# Patient Record
Sex: FEMALE | Race: WHITE | URBAN - NONMETROPOLITAN AREA
[De-identification: names, ages, dates, MRNs, and addresses within clinical notes are randomized per-mention and may not be internally consistent; named-entity substitution may affect disease eponyms.]

---

## 2017-10-19 ENCOUNTER — DOCTOR'S OFFICE (OUTPATIENT)
Dept: URBAN - NONMETROPOLITAN AREA CLINIC 1 | Facility: CLINIC | Age: 52
Setting detail: OPHTHALMOLOGY
End: 2017-10-19

## 2017-10-19 DIAGNOSIS — Z96.1: ICD-10-CM

## 2017-10-19 PROCEDURE — C78483: Performed by: OPHTHALMOLOGY

## 2017-10-19 PROCEDURE — TAX CODE TAXABLE SALES: Performed by: OPHTHALMOLOGY

## 2017-11-09 ENCOUNTER — RX ONLY (RX ONLY)
Age: 52
End: 2017-11-09

## 2017-11-09 ENCOUNTER — DOCTOR'S OFFICE (OUTPATIENT)
Dept: URBAN - METROPOLITAN AREA CLINIC 136 | Facility: CLINIC | Age: 52
Setting detail: OPHTHALMOLOGY
End: 2017-11-09

## 2017-11-09 DIAGNOSIS — L98.8: ICD-10-CM

## 2017-11-09 DIAGNOSIS — H52.223: ICD-10-CM

## 2017-11-09 DIAGNOSIS — H52.13: ICD-10-CM

## 2017-11-09 DIAGNOSIS — H52.4: ICD-10-CM

## 2017-11-09 PROBLEM — H44.30 MYOPIC DEGENERATION: Status: ACTIVE | Noted: 2017-11-09

## 2017-11-09 PROBLEM — H25.013 CATARACT CORTICAL SENILE; BOTH EYES: Status: ACTIVE | Noted: 2017-11-09

## 2017-11-09 PROBLEM — H43.811 POST VITREOUS DETACHMENT; RIGHT EYE: Status: ACTIVE | Noted: 2017-11-09

## 2017-11-09 PROBLEM — H40.013 GLAUCOMA SUSPECT OPEN ANGLE; BOTH EYES: Status: ACTIVE | Noted: 2017-11-09

## 2017-11-09 PROCEDURE — NO CHARGE N/C PROFESSIONAL COURTESY: Performed by: OPTOMETRIST

## 2017-11-09 ASSESSMENT — REFRACTION_OUTSIDERX
OS_AXIS: 080
OS_ADD: +2.25
OD_ADD: +2.25
OD_VA1: 20/20
OD_CYLINDER: -0.50
OS_VA2: 20/20
OD_SPHERE: -3.25
OS_VA3: 20/
OD_VA2: 20/20
OD_VA3: 20/
OS_SPHERE: -2.00
OD_AXIS: 045
OS_VA1: 20/20
OU_VA: 20/20
OS_CYLINDER: -1.00

## 2017-11-09 ASSESSMENT — REFRACTION_CURRENTRX
OD_CYLINDER: -0.75
OS_OVR_VA: 20/
OS_VPRISM_DIRECTION: PROGS
OD_OVR_VA: 20/
OD_OVR_VA: 20/
OS_SPHERE: -2.00
OS_CYLINDER: -0.75
OD_AXIS: 31
OS_AXIS: 60
OS_OVR_VA: 20/
OS_OVR_VA: 20/
OD_SPHERE: -3.25
OD_VPRISM_DIRECTION: PROGS
OD_ADD: +2.00
OS_ADD: +2.00
OD_OVR_VA: 20/

## 2017-11-09 ASSESSMENT — CONFRONTATIONAL VISUAL FIELD TEST (CVF)
OD_FINDINGS: FULL
OS_FINDINGS: FULL

## 2017-11-09 ASSESSMENT — REFRACTION_MANIFEST
OS_VA2: 20/
OS_VA2: 20/
OD_VA1: 20/
OS_VA3: 20/
OS_VA3: 20/
OD_VA1: 20/
OU_VA: 20/
OS_VA1: 20/
OS_VA1: 20/
OD_VA2: 20/
OU_VA: 20/
OD_VA2: 20/
OD_VA3: 20/
OD_VA3: 20/

## 2017-11-09 ASSESSMENT — VISUAL ACUITY
OS_BCVA: 20/40-1
OD_BCVA: 20/40

## 2017-11-09 ASSESSMENT — REFRACTION_AUTOREFRACTION
OS_CYLINDER: -1.00
OD_AXIS: 035
OS_AXIS: 080
OS_SPHERE: -2.50
OD_CYLINDER: -0.75
OD_SPHERE: -4.00

## 2017-11-09 ASSESSMENT — KERATOMETRY
OD_AXISANGLE_DEGREES: 107
OD_K1POWER_DIOPTERS: 37.25
OS_K2POWER_DIOPTERS: 36.75
OS_AXISANGLE_DEGREES: 145
OD_K2POWER_DIOPTERS: 37.25
OS_K1POWER_DIOPTERS: 37.00

## 2017-11-09 ASSESSMENT — SPHEQUIV_DERIVED
OD_SPHEQUIV: -4.375
OS_SPHEQUIV: -3

## 2017-11-09 ASSESSMENT — CORNEAL DYSTROPHY - POSTERIOR: OD_POSTERIORDYSTROPHY: GUTTATA

## 2017-11-09 ASSESSMENT — AXIALLENGTH_DERIVED
OD_AL: 28.41
OS_AL: 27.84

## 2017-11-09 ASSESSMENT — DRY EYES - PHYSICIAN NOTES: OD_GENERALCOMMENTS: KSICCA

## 2018-02-06 ENCOUNTER — DOCTOR'S OFFICE (OUTPATIENT)
Dept: URBAN - METROPOLITAN AREA CLINIC 136 | Facility: CLINIC | Age: 53
Setting detail: OPHTHALMOLOGY
End: 2018-02-06

## 2018-02-06 DIAGNOSIS — Z96.1: ICD-10-CM

## 2018-02-06 PROCEDURE — P0000335: Performed by: OPHTHALMOLOGY

## 2018-02-06 PROCEDURE — P0001259 AGE DEFYING ANTIOXIDANT EYE CREAM: Performed by: OPHTHALMOLOGY

## 2018-02-06 PROCEDURE — P0002189 MILD GEL CLEANSER 200 ML: Performed by: OPHTHALMOLOGY

## 2018-04-26 ENCOUNTER — OPTICAL OFFICE (OUTPATIENT)
Dept: URBAN - NONMETROPOLITAN AREA CLINIC 4 | Facility: CLINIC | Age: 53
Setting detail: OPHTHALMOLOGY
End: 2018-04-26

## 2018-04-26 DIAGNOSIS — H52.13: ICD-10-CM

## 2018-04-26 PROCEDURE — S0500 DISPOS CONT LENS: HCPCS | Performed by: OPTOMETRIST

## 2018-04-30 ENCOUNTER — OPTICAL OFFICE (OUTPATIENT)
Dept: URBAN - NONMETROPOLITAN AREA CLINIC 4 | Facility: CLINIC | Age: 53
Setting detail: OPHTHALMOLOGY
End: 2018-04-30

## 2018-04-30 DIAGNOSIS — H52.7: ICD-10-CM

## 2018-04-30 PROCEDURE — V2799T TAXABLE VISION SERVICE MISCELLANEOUS: Performed by: OPTOMETRIST

## 2019-11-22 ENCOUNTER — OFFICE VISIT (OUTPATIENT)
Dept: URGENT CARE | Facility: CLINIC | Age: 54
End: 2019-11-22
Payer: COMMERCIAL

## 2019-11-22 VITALS
HEART RATE: 77 BPM | WEIGHT: 160 LBS | BODY MASS INDEX: 24.25 KG/M2 | RESPIRATION RATE: 18 BRPM | DIASTOLIC BLOOD PRESSURE: 73 MMHG | SYSTOLIC BLOOD PRESSURE: 111 MMHG | HEIGHT: 68 IN | TEMPERATURE: 98.4 F | OXYGEN SATURATION: 97 %

## 2019-11-22 DIAGNOSIS — R10.12 LEFT UPPER QUADRANT PAIN: Primary | ICD-10-CM

## 2019-11-22 PROCEDURE — S9088 SERVICES PROVIDED IN URGENT: HCPCS | Performed by: EMERGENCY MEDICINE

## 2019-11-22 PROCEDURE — 99203 OFFICE O/P NEW LOW 30 MIN: CPT | Performed by: EMERGENCY MEDICINE

## 2019-11-22 NOTE — PROGRESS NOTES
[facility]        NAME: Arturo Paul is a 53 y o  female  : 1965    MRN: [id number]  DATE: 2019  TIME: 9:06 AM    Assessment and Plan   Left upper quadrant pain [R10 12]  1  Left upper quadrant pain  Ambulatory referral to Gastroenterology     I offered to send patient to the ER for further evaluation but she refuses  She will agree to a GI referral     Patient Instructions     Patient Instructions     Abdominal Pain   WHAT YOU NEED TO KNOW:   Abdominal pain can be dull, achy, or sharp  You may have pain in one area of your abdomen, or in your entire abdomen  Your pain may be caused by a condition such as constipation, food sensitivity or poisoning, infection, or a blockage  Abdominal pain can also be from a hernia, appendicitis, or an ulcer  Liver, gallbladder, or kidney conditions can also cause abdominal pain  The cause of your abdominal pain may be unknown  DISCHARGE INSTRUCTIONS:   Return to the emergency department if:   · You have new chest pain or shortness of breath  · You have pulsing pain in your upper abdomen or lower back that suddenly becomes constant  · Your pain is in the right lower abdominal area and worsens with movement  · You have a fever over 100 4°F (38°C) or shaking chills  · You are vomiting and cannot keep food or liquids down  · Your pain does not improve or gets worse over the next 8 to 12 hours  · You see blood in your vomit or bowel movements, or they look black and tarry  · Your skin or the whites of your eyes turn yellow  · You are a woman and have a large amount of vaginal bleeding that is not your monthly period  Contact your healthcare provider if:   · You have pain in your lower back  · You are a man and have pain in your testicles  · You have pain when you urinate  · You have questions or concerns about your condition or care    Follow up with your healthcare provider within 24 hours or as directed: Write down your questions so you remember to ask them during your visits  Medicines:   · Medicines  may be given to calm your stomach and prevent vomiting or to decrease pain  Ask how to take pain medicine safely  · Take your medicine as directed  Contact your healthcare provider if you think your medicine is not helping or if you have side effects  Tell him of her if you are allergic to any medicine  Keep a list of the medicines, vitamins, and herbs you take  Include the amounts, and when and why you take them  Bring the list or the pill bottles to follow-up visits  Carry your medicine list with you in case of an emergency  © 2017 [employer] Information is for End User's use only and may not be sold, redistributed or otherwise used for commercial purposes  All illustrations and images included in CareNotes® are the copyrighted property of [employer]  or Jamar Diaz  The above information is an  only  It is not intended as medical advice for individual conditions or treatments  Talk to your doctor, nurse or pharmacist before following any medical regimen to see if it is safe and effective for you  Muscle Strain   WHAT YOU NEED TO KNOW:   A muscle strain is a twist, pull, or tear of a muscle or tendon  A tendon is a strong elastic tissue that connects a muscle to a bone  Signs of a strained muscle include bruising and swelling over the area, pain with movement, and loss of strength  DISCHARGE INSTRUCTIONS:   Return to the emergency department if:   · You suddenly cannot feel or move your injured muscle  Contact your healthcare provider if:   · Your pain and swelling worsen or do not go away  · You have questions or concerns about your condition or care  Medicines:   · NSAIDs  help decrease swelling and pain or fever  This medicine is available with or without a doctor's order   NSAIDs can cause stomach bleeding or kidney problems in certain people  If you take blood thinner medicine, always ask your healthcare provider if NSAIDs are safe for you  Always read the medicine label and follow directions  · Muscle relaxers  help decrease pain and muscle spasms  · Take your medicine as directed  Contact your healthcare provider if you think your medicine is not helping or if you have side effects  Tell him of her if you are allergic to any medicine  Keep a list of the medicines, vitamins, and herbs you take  Include the amounts, and when and why you take them  Bring the list or the pill bottles to follow-up visits  Carry your medicine list with you in case of an emergency  Follow up with your healthcare provider as directed: Your healthcare provider may suggest that you have a follow-up visit before you go back to your usual activity  Write down your questions so you remember to ask them during your visits  Self-care:   · 3 to 7 days after the injury:  Use Rest, Ice, Compression, and Elevation (RICE) to help stop bruising and decrease pain and swelling  ¨ Rest:  Rest your muscle to allow your injury to heal  When the pain decreases, begin normal, slow movements  For mild and moderate muscle strains, you should rest your muscles for about 2 days  However, if you have a severe muscle strain, you should rest for 10 to 14 days  You may need to use crutches to walk if your muscle strain is in your legs or lower body  ¨ Ice:  Put an ice pack on the injured area  Put a towel between the ice pack and your skin  Do not put the ice pack directly on your skin  You can use a package of frozen peas instead of an ice pack  ¨ Compression:  You may need to wrap an elastic bandage around the area to decrease swelling  It should be tight enough for you to feel support  Do not wrap it too tightly  ¨ Elevation:  Keep the injured muscle raised above your heart if possible   For example if you have a strain of your lower leg muscle, lie down and prop your leg up on pillows  This helps decrease pain and swelling  · 3 to 21 days after the injury:  Start to slowly and regularly exercise your muscle  This will help it heal  If you feel pain, decrease how hard you are exercising  · 1 to 6 weeks after the injury:  Stretch the injured muscle  Hold the stretch for about 30 seconds  Do this 4 times a day  You may stretch the muscle until you feel a slight pull  Stop stretching if you feel pain  · 2 weeks to 6 months after the injury:  The goal of this phase is to return to the activity you were doing before the injury happened, without hurting the muscle again  · 3 weeks to 6 months after the injury:  Keep stretching and strengthening your muscles to avoid injury  Slowly increase the time and distance that you exercise  You may have signs and symptoms of muscle strain 6 months after the injury, even if you do things to help it heal  In this case, you may need surgery on the muscle  © 2017 [employer] Information is for End User's use only and may not be sold, redistributed or otherwise used for commercial purposes  All illustrations and images included in CareNotes® are the copyrighted property of [employer]  or Jamar Diaz  The above information is an  only  It is not intended as medical advice for individual conditions or treatments  Talk to your doctor, nurse or pharmacist before following any medical regimen to see if it is safe and effective for you  Follow up with PCP in 3-5 days  Proceed to  ER if symptoms worsen  Chief Complaint     Chief Complaint   Patient presents with    Abdominal Pain     left upper abd pain started Monday after yoga  pt states that she has been having a full pain for awhile but the sharp pain started Monday  denies n/v  c/o little bit of diarrhea  History of Present Illness       Patient complains of left upper quadrant pain since yoga class 4 days ago    She also admits to some dull epigastric pain for the past 2 weeks  She denies nausea, vomiting or constipation but admits to some diarrhea recently  Review of Systems   Review of Systems   Constitutional: Negative for chills, fatigue and fever  HENT: Negative for congestion, trouble swallowing and voice change  Respiratory: Negative for cough, chest tightness, shortness of breath and wheezing  Cardiovascular: Negative for chest pain  Gastrointestinal: Positive for abdominal pain and diarrhea  Negative for abdominal distention, anal bleeding, blood in stool, nausea, rectal pain and vomiting  Genitourinary: Negative for dysuria  Musculoskeletal: Negative for neck stiffness  Skin: Negative for rash  Hematological: Negative for adenopathy  Current Medications     No current outpatient medications on file  Current Allergies     Allergies as of 11/22/2019    (No Known Allergies)            The following portions of the patient's history were reviewed and updated as appropriate: allergies, current medications, past family history, past medical history, past social history, past surgical history and problem list      History reviewed  No pertinent past medical history  History reviewed  No pertinent surgical history  Family History   Problem Relation Age of Onset    Colon cancer Father     Lung cancer Father          Medications have been verified  Objective   /73   Pulse 77   Temp 98 4 °F (36 9 °C) (Tympanic)   Resp 18   Ht 5' 8" (1 727 m)   Wt 72 6 kg (160 lb)   SpO2 97%   BMI 24 33 kg/m²        Physical Exam     Physical Exam   Constitutional: She is oriented to person, place, and time  She appears well-developed and well-nourished  No distress  HENT:   Head: Normocephalic and atraumatic  Right Ear: Tympanic membrane and external ear normal    Left Ear: Tympanic membrane and external ear normal    Nose: Mucosal edema present     Mouth/Throat: Posterior oropharyngeal erythema present  No oropharyngeal exudate or tonsillar abscesses  Neck: Neck supple  Cardiovascular: Normal rate and regular rhythm  Pulmonary/Chest: Effort normal    Abdominal: Soft  Bowel sounds are normal  She exhibits no distension and no mass  There is tenderness  There is no rebound and no guarding  Tender left upper abdomen muscles at anterior axillary line at margin of rib cage left, no crepitus, no splenomegaly  Neurological: She is alert and oriented to person, place, and time  Skin: Skin is warm and dry  No rash noted  No pallor  Nursing note and vitals reviewed

## 2019-11-22 NOTE — PATIENT INSTRUCTIONS
Abdominal Pain   WHAT YOU NEED TO KNOW:   Abdominal pain can be dull, achy, or sharp  You may have pain in one area of your abdomen, or in your entire abdomen  Your pain may be caused by a condition such as constipation, food sensitivity or poisoning, infection, or a blockage  Abdominal pain can also be from a hernia, appendicitis, or an ulcer  Liver, gallbladder, or kidney conditions can also cause abdominal pain  The cause of your abdominal pain may be unknown  DISCHARGE INSTRUCTIONS:   Return to the emergency department if:   · You have new chest pain or shortness of breath  · You have pulsing pain in your upper abdomen or lower back that suddenly becomes constant  · Your pain is in the right lower abdominal area and worsens with movement  · You have a fever over 100 4°F (38°C) or shaking chills  · You are vomiting and cannot keep food or liquids down  · Your pain does not improve or gets worse over the next 8 to 12 hours  · You see blood in your vomit or bowel movements, or they look black and tarry  · Your skin or the whites of your eyes turn yellow  · You are a woman and have a large amount of vaginal bleeding that is not your monthly period  Contact your healthcare provider if:   · You have pain in your lower back  · You are a man and have pain in your testicles  · You have pain when you urinate  · You have questions or concerns about your condition or care  Follow up with your healthcare provider within 24 hours or as directed:  Write down your questions so you remember to ask them during your visits  Medicines:   · Medicines  may be given to calm your stomach and prevent vomiting or to decrease pain  Ask how to take pain medicine safely  · Take your medicine as directed  Contact your healthcare provider if you think your medicine is not helping or if you have side effects  Tell him of her if you are allergic to any medicine   Keep a list of the medicines, vitamins, and herbs you take  Include the amounts, and when and why you take them  Bring the list or the pill bottles to follow-up visits  Carry your medicine list with you in case of an emergency  © 2017 [employer]  Information is for End User's use only and may not be sold, redistributed or otherwise used for commercial purposes  All illustrations and images included in CareNotes® are the copyrighted property of [employer]  or Jamar Diaz  The above information is an  only  It is not intended as medical advice for individual conditions or treatments  Talk to your doctor, nurse or pharmacist before following any medical regimen to see if it is safe and effective for you  Muscle Strain   WHAT YOU NEED TO KNOW:   A muscle strain is a twist, pull, or tear of a muscle or tendon  A tendon is a strong elastic tissue that connects a muscle to a bone  Signs of a strained muscle include bruising and swelling over the area, pain with movement, and loss of strength  DISCHARGE INSTRUCTIONS:   Return to the emergency department if:   · You suddenly cannot feel or move your injured muscle  Contact your healthcare provider if:   · Your pain and swelling worsen or do not go away  · You have questions or concerns about your condition or care  Medicines:   · NSAIDs  help decrease swelling and pain or fever  This medicine is available with or without a doctor's order  NSAIDs can cause stomach bleeding or kidney problems in certain people  If you take blood thinner medicine, always ask your healthcare provider if NSAIDs are safe for you  Always read the medicine label and follow directions  · Muscle relaxers  help decrease pain and muscle spasms  · Take your medicine as directed  Contact your healthcare provider if you think your medicine is not helping or if you have side effects  Tell him of her if you are allergic to any medicine   Keep a list of the medicines, vitamins, and herbs you take  Include the amounts, and when and why you take them  Bring the list or the pill bottles to follow-up visits  Carry your medicine list with you in case of an emergency  Follow up with your healthcare provider as directed: Your healthcare provider may suggest that you have a follow-up visit before you go back to your usual activity  Write down your questions so you remember to ask them during your visits  Self-care:   · 3 to 7 days after the injury:  Use Rest, Ice, Compression, and Elevation (RICE) to help stop bruising and decrease pain and swelling  ¨ Rest:  Rest your muscle to allow your injury to heal  When the pain decreases, begin normal, slow movements  For mild and moderate muscle strains, you should rest your muscles for about 2 days  However, if you have a severe muscle strain, you should rest for 10 to 14 days  You may need to use crutches to walk if your muscle strain is in your legs or lower body  ¨ Ice:  Put an ice pack on the injured area  Put a towel between the ice pack and your skin  Do not put the ice pack directly on your skin  You can use a package of frozen peas instead of an ice pack  ¨ Compression:  You may need to wrap an elastic bandage around the area to decrease swelling  It should be tight enough for you to feel support  Do not wrap it too tightly  ¨ Elevation:  Keep the injured muscle raised above your heart if possible  For example if you have a strain of your lower leg muscle, lie down and prop your leg up on pillows  This helps decrease pain and swelling  · 3 to 21 days after the injury:  Start to slowly and regularly exercise your muscle  This will help it heal  If you feel pain, decrease how hard you are exercising  · 1 to 6 weeks after the injury:  Stretch the injured muscle  Hold the stretch for about 30 seconds  Do this 4 times a day  You may stretch the muscle until you feel a slight pull  Stop stretching if you feel pain  · 2 weeks to 6 months after the injury:  The goal of this phase is to return to the activity you were doing before the injury happened, without hurting the muscle again  · 3 weeks to 6 months after the injury:  Keep stretching and strengthening your muscles to avoid injury  Slowly increase the time and distance that you exercise  You may have signs and symptoms of muscle strain 6 months after the injury, even if you do things to help it heal  In this case, you may need surgery on the muscle  © 2017 [address] [facility] Information is for End User's use only and may not be sold, redistributed or otherwise used for commercial purposes  All illustrations and images included in CareNotes® are the copyrighted property of [employer]  or Jam[employer] Diaz  The above information is an  only  It is not intended as medical advice for individual conditions or treatments  Talk to your doctor, nurse or pharmacist before following any medical regimen to see if it is safe and effective for you

## 2022-10-20 ENCOUNTER — APPOINTMENT (EMERGENCY)
Dept: RADIOLOGY | Facility: HOSPITAL | Age: 57
End: 2022-10-20
Payer: COMMERCIAL

## 2022-10-20 ENCOUNTER — HOSPITAL ENCOUNTER (EMERGENCY)
Facility: HOSPITAL | Age: 57
Discharge: HOME | End: 2022-10-20
Attending: EMERGENCY MEDICINE
Payer: COMMERCIAL

## 2022-10-20 VITALS
TEMPERATURE: 98.4 F | SYSTOLIC BLOOD PRESSURE: 116 MMHG | HEART RATE: 85 BPM | DIASTOLIC BLOOD PRESSURE: 90 MMHG | WEIGHT: 150 LBS | RESPIRATION RATE: 17 BRPM | OXYGEN SATURATION: 98 % | BODY MASS INDEX: 22.73 KG/M2 | HEIGHT: 68 IN

## 2022-10-20 DIAGNOSIS — S89.91XA KNEE INJURY, RIGHT, INITIAL ENCOUNTER: Primary | ICD-10-CM

## 2022-10-20 PROCEDURE — 99284 EMERGENCY DEPT VISIT MOD MDM: CPT | Performed by: EMERGENCY MEDICINE

## 2022-10-20 PROCEDURE — 73564 X-RAY EXAM KNEE 4 OR MORE: CPT

## 2022-10-20 PROCEDURE — 99283 EMERGENCY DEPT VISIT LOW MDM: CPT

## 2022-10-20 RX ORDER — IBUPROFEN 600 MG/1
600 TABLET ORAL ONCE
Status: COMPLETED | OUTPATIENT
Start: 2022-10-20 | End: 2022-10-20

## 2022-10-20 RX ADMIN — IBUPROFEN 600 MG: 600 TABLET ORAL at 20:52

## 2022-10-21 NOTE — ED PROVIDER NOTES
History  Chief Complaint   Patient presents with   • Knee Pain     Right knee pain since 1800 when a neighbors dog ran into her right knee  Pt unable to bear weight without intense pain  Pt did ice knee and took no meds  Patient's dog ran into her right knee tonight  Complains of pain  Hurts to bear weight  No history of previous fractures  Nothing taken prior to arrival       History provided by:  Patient   used: No    Knee Pain  Location:  Knee  Time since incident:  2 hours  Injury: yes    Mechanism of injury comment:  Dog ran into knee  Knee location:  R knee  Pain details:     Quality:  Aching    Radiates to:  Does not radiate    Severity:  Mild    Onset quality:  Sudden    Duration:  2 hours    Timing:  Constant  Chronicity:  New  Dislocation: no    Prior injury to area:  No  Relieved by:  Nothing  Worsened by:  Bearing weight  Ineffective treatments: Ice  Associated symptoms: no back pain, no fever, no itching, no neck pain and no tingling        None       History reviewed  No pertinent past medical history  History reviewed  No pertinent surgical history  Family History   Problem Relation Age of Onset   • Colon cancer Father    • Lung cancer Father      I have reviewed and agree with the history as documented  E-Cigarette/Vaping     E-Cigarette/Vaping Substances     Social History     Tobacco Use   • Smoking status: Never Smoker   • Smokeless tobacco: Never Used   Substance Use Topics   • Alcohol use: Never   • Drug use: Never       Review of Systems   Constitutional: Negative for chills and fever  HENT: Negative for ear pain, hearing loss, sore throat, trouble swallowing and voice change  Eyes: Negative for pain and discharge  Respiratory: Negative for cough, shortness of breath and wheezing  Cardiovascular: Negative for chest pain and palpitations  Gastrointestinal: Negative for abdominal pain, blood in stool, constipation, diarrhea, nausea and vomiting  Genitourinary: Negative for dysuria, flank pain, frequency and hematuria  Musculoskeletal: Positive for arthralgias  Negative for back pain, joint swelling, neck pain and neck stiffness  Skin: Negative for itching, rash and wound  Neurological: Negative for dizziness, seizures, syncope, facial asymmetry and headaches  Psychiatric/Behavioral: Negative for hallucinations, self-injury and suicidal ideas  All other systems reviewed and are negative  Physical Exam  Physical Exam  Constitutional:       General: She is not in acute distress  Appearance: Normal appearance  She is not ill-appearing  HENT:      Head: Normocephalic and atraumatic  Right Ear: External ear normal       Left Ear: External ear normal       Nose: Nose normal       Mouth/Throat:      Mouth: Mucous membranes are moist    Eyes:      Extraocular Movements: Extraocular movements intact  Pupils: Pupils are equal, round, and reactive to light  Cardiovascular:      Rate and Rhythm: Normal rate and regular rhythm  Pulmonary:      Effort: Pulmonary effort is normal  No respiratory distress  Breath sounds: Normal breath sounds  Abdominal:      General: Abdomen is flat  Bowel sounds are normal  There is no distension  Palpations: Abdomen is soft  Tenderness: There is no abdominal tenderness  Musculoskeletal:         General: Tenderness present  No swelling  Cervical back: Normal range of motion and neck supple  Comments: Right knee with full range of motion  Tender along the medial aspect of the tibial plateau  No joint line tenderness  No laxity  Skin:     General: Skin is warm and dry  Capillary Refill: Capillary refill takes less than 2 seconds  Neurological:      General: No focal deficit present  Mental Status: She is alert and oriented to person, place, and time     Psychiatric:         Mood and Affect: Mood normal          Behavior: Behavior normal          Vital Signs  ED Triage Vitals [10/20/22 1957]   Temperature Pulse Respirations Blood Pressure SpO2   98 4 °F (36 9 °C) 85 17 116/90 98 %      Temp Source Heart Rate Source Patient Position - Orthostatic VS BP Location FiO2 (%)   Temporal Monitor Sitting Right arm --      Pain Score       --           Vitals:    10/20/22 1957   BP: 116/90   Pulse: 85   Patient Position - Orthostatic VS: Sitting         Visual Acuity      ED Medications  Medications - No data to display    Diagnostic Studies  Results Reviewed     None                 XR knee 4+ vw right injury   ED Interpretation by Tisha Huitron MD (10/20 2029)   No fx                 Procedures  Splint application    Date/Time: 10/20/2022 8:32 PM  Performed by: Tisha Huitron MD  Authorized by: Tisha Huitron MD   Universal Protocol:  Consent: Verbal consent obtained  Consent given by: patient  Patient identity confirmed: verbally with patient      Pre-procedure details:     Sensation:  Normal  Procedure details:     Laterality:  Right    Location:  Knee    Knee:  R knee    Strapping: yes      Supplies:  Knee immobilizer  Post-procedure details:     Pain:  Unchanged    Sensation:  Normal    Patient tolerance of procedure: Tolerated well, no immediate complications             ED Course                               SBIRT 20yo+    Flowsheet Row Most Recent Value   SBIRT (23 yo +)    In order to provide better care to our patients, we are screening all of our patients for alcohol and drug use  Would it be okay to ask you these screening questions?  No Filed at: 10/20/2022 1958                    MDM    Disposition  Final diagnoses:   Knee injury, right, initial encounter     Time reflects when diagnosis was documented in both MDM as applicable and the Disposition within this note     Time User Action Codes Description Comment    10/20/2022  8:33 PM Selam Doan Add [F49 [id number]] Knee injury, right, initial encounter       ED Disposition     ED Disposition   Discharge Condition   Stable    Date/Time   Thu Oct 20, 2022  8:33 PM    Comment   Luis Miguel Mccullough discharge to home/self care  Follow-up Information     Follow up With Specialties Details Why Contact Joselin Paniagua DO Family Medicine Call in 1 day  [address] Orthopedic Surgery Call in 1 day  [address] [place] [address] [phone]            Patient's Medications    No medications on file       No discharge procedures on file      PDMP Review     None          ED Provider  Electronically Signed by           Carolyn Hall MD  10/20/22 2034

## 2025-01-15 ENCOUNTER — APPOINTMENT (EMERGENCY)
Dept: CT IMAGING | Facility: HOSPITAL | Age: 60
End: 2025-01-15
Payer: COMMERCIAL

## 2025-01-15 ENCOUNTER — HOSPITAL ENCOUNTER (EMERGENCY)
Facility: HOSPITAL | Age: 60
Discharge: HOME | End: 2025-01-15
Attending: EMERGENCY MEDICINE
Payer: COMMERCIAL

## 2025-01-15 ENCOUNTER — APPOINTMENT (EMERGENCY)
Dept: RADIOLOGY | Facility: HOSPITAL | Age: 60
End: 2025-01-15
Payer: COMMERCIAL

## 2025-01-15 VITALS
OXYGEN SATURATION: 97 % | WEIGHT: 164.9 LBS | TEMPERATURE: 98.2 F | HEART RATE: 79 BPM | HEIGHT: 69 IN | DIASTOLIC BLOOD PRESSURE: 76 MMHG | RESPIRATION RATE: 18 BRPM | BODY MASS INDEX: 24.42 KG/M2 | SYSTOLIC BLOOD PRESSURE: 130 MMHG

## 2025-01-15 DIAGNOSIS — S62.109A WRIST FRACTURE: Primary | ICD-10-CM

## 2025-01-15 PROCEDURE — 99285 EMERGENCY DEPT VISIT HI MDM: CPT | Performed by: EMERGENCY MEDICINE

## 2025-01-15 PROCEDURE — 73110 X-RAY EXAM OF WRIST: CPT

## 2025-01-15 PROCEDURE — 96375 TX/PRO/DX INJ NEW DRUG ADDON: CPT

## 2025-01-15 PROCEDURE — 71260 CT THORAX DX C+: CPT

## 2025-01-15 PROCEDURE — 96372 THER/PROPH/DIAG INJ SC/IM: CPT

## 2025-01-15 PROCEDURE — 74177 CT ABD & PELVIS W/CONTRAST: CPT

## 2025-01-15 PROCEDURE — 73100 X-RAY EXAM OF WRIST: CPT

## 2025-01-15 PROCEDURE — 96374 THER/PROPH/DIAG INJ IV PUSH: CPT

## 2025-01-15 PROCEDURE — 99284 EMERGENCY DEPT VISIT MOD MDM: CPT

## 2025-01-15 PROCEDURE — 29105 APPLICATION LONG ARM SPLINT: CPT | Performed by: EMERGENCY MEDICINE

## 2025-01-15 RX ORDER — OXYCODONE AND ACETAMINOPHEN 5; 325 MG/1; MG/1
1 TABLET ORAL EVERY 8 HOURS PRN
Qty: 15 TABLET | Refills: 0 | Status: SHIPPED | OUTPATIENT
Start: 2025-01-15 | End: 2025-01-15

## 2025-01-15 RX ORDER — ONDANSETRON 2 MG/ML
4 INJECTION INTRAMUSCULAR; INTRAVENOUS ONCE
Status: COMPLETED | OUTPATIENT
Start: 2025-01-15 | End: 2025-01-15

## 2025-01-15 RX ORDER — MORPHINE SULFATE 4 MG/ML
4 INJECTION, SOLUTION INTRAMUSCULAR; INTRAVENOUS ONCE
Status: COMPLETED | OUTPATIENT
Start: 2025-01-15 | End: 2025-01-15

## 2025-01-15 RX ORDER — ETOMIDATE 2 MG/ML
20 INJECTION INTRAVENOUS ONCE
Status: COMPLETED | OUTPATIENT
Start: 2025-01-15 | End: 2025-01-15

## 2025-01-15 RX ORDER — IBUPROFEN 800 MG/1
800 TABLET, FILM COATED ORAL 3 TIMES DAILY
Qty: 30 TABLET | Refills: 0 | Status: SHIPPED | OUTPATIENT
Start: 2025-01-15

## 2025-01-15 RX ORDER — IBUPROFEN 800 MG/1
800 TABLET, FILM COATED ORAL 3 TIMES DAILY
Qty: 30 TABLET | Refills: 0 | Status: SHIPPED | OUTPATIENT
Start: 2025-01-15 | End: 2025-01-15

## 2025-01-15 RX ORDER — OXYCODONE AND ACETAMINOPHEN 5; 325 MG/1; MG/1
1 TABLET ORAL EVERY 8 HOURS PRN
Qty: 15 TABLET | Refills: 0 | Status: SHIPPED | OUTPATIENT
Start: 2025-01-15 | End: 2025-01-21

## 2025-01-15 RX ADMIN — IOHEXOL 100 ML: 350 INJECTION, SOLUTION INTRAVENOUS at 17:16

## 2025-01-15 RX ADMIN — MORPHINE SULFATE 2 MG: 2 INJECTION, SOLUTION INTRAMUSCULAR; INTRAVENOUS at 15:24

## 2025-01-15 RX ADMIN — MORPHINE SULFATE 4 MG: 4 INJECTION INTRAVENOUS at 16:42

## 2025-01-15 RX ADMIN — MORPHINE SULFATE 2 MG: 2 INJECTION, SOLUTION INTRAMUSCULAR; INTRAVENOUS at 19:00

## 2025-01-15 RX ADMIN — ONDANSETRON 4 MG: 2 INJECTION INTRAMUSCULAR; INTRAVENOUS at 16:40

## 2025-01-15 RX ADMIN — ETOMIDATE INJECTION 20 MG: 2 SOLUTION INTRAVENOUS at 17:55
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
## 2025-01-16 ENCOUNTER — HOSPITAL ENCOUNTER (OUTPATIENT)
Dept: RADIOLOGY | Facility: CLINIC | Age: 60
End: 2025-01-16
Payer: COMMERCIAL

## 2025-01-16 ENCOUNTER — OFFICE VISIT (OUTPATIENT)
Dept: OBGYN CLINIC | Facility: CLINIC | Age: 60
End: 2025-01-16
Payer: COMMERCIAL

## 2025-01-16 VITALS — WEIGHT: 164 LBS | HEIGHT: 69 IN | BODY MASS INDEX: 24.29 KG/M2

## 2025-01-16 DIAGNOSIS — S52.601A CLOSED FRACTURE OF DISTAL ENDS OF RIGHT RADIUS AND ULNA, INITIAL ENCOUNTER: Primary | ICD-10-CM

## 2025-01-16 DIAGNOSIS — S52.501A CLOSED FRACTURE OF DISTAL ENDS OF RIGHT RADIUS AND ULNA, INITIAL ENCOUNTER: Primary | ICD-10-CM

## 2025-01-16 DIAGNOSIS — M25.531 PAIN IN RIGHT WRIST: ICD-10-CM

## 2025-01-16 DIAGNOSIS — S52.601A CLOSED FRACTURE OF DISTAL ENDS OF RIGHT RADIUS AND ULNA, INITIAL ENCOUNTER: ICD-10-CM

## 2025-01-16 DIAGNOSIS — S52.501A CLOSED FRACTURE OF DISTAL ENDS OF RIGHT RADIUS AND ULNA, INITIAL ENCOUNTER: ICD-10-CM

## 2025-01-16 PROCEDURE — 29125 APPL SHORT ARM SPLINT STATIC: CPT | Performed by: ORTHOPAEDIC SURGERY

## 2025-01-16 PROCEDURE — 99204 OFFICE O/P NEW MOD 45 MIN: CPT | Performed by: ORTHOPAEDIC SURGERY

## 2025-01-16 PROCEDURE — 73110 X-RAY EXAM OF WRIST: CPT

## 2025-01-16 RX ORDER — ONDANSETRON 4 MG/1
4 TABLET, FILM COATED ORAL EVERY 8 HOURS PRN
Qty: 10 TABLET | Refills: 0 | Status: SHIPPED | OUTPATIENT
Start: 2025-01-16

## 2025-01-16 RX ORDER — OXYCODONE HYDROCHLORIDE 5 MG/1
5-10 TABLET ORAL EVERY 6 HOURS PRN
Qty: 30 TABLET | Refills: 0 | Status: SHIPPED | OUTPATIENT
Start: 2025-01-16

## 2025-01-17 ENCOUNTER — HOSPITAL ENCOUNTER (OUTPATIENT)
Dept: RADIOLOGY | Facility: CLINIC | Age: 60
End: 2025-01-17
Payer: COMMERCIAL

## 2025-01-17 ENCOUNTER — OFFICE VISIT (OUTPATIENT)
Dept: OBGYN CLINIC | Facility: CLINIC | Age: 60
End: 2025-01-17
Payer: COMMERCIAL

## 2025-01-17 VITALS — BODY MASS INDEX: 23.55 KG/M2 | HEIGHT: 69 IN | WEIGHT: 159 LBS

## 2025-01-17 DIAGNOSIS — S52.601A CLOSED FRACTURE OF DISTAL ENDS OF RIGHT RADIUS AND ULNA, INITIAL ENCOUNTER: ICD-10-CM

## 2025-01-17 DIAGNOSIS — S52.501A CLOSED FRACTURE OF DISTAL ENDS OF RIGHT RADIUS AND ULNA, INITIAL ENCOUNTER: Primary | ICD-10-CM

## 2025-01-17 DIAGNOSIS — M25.531 PAIN IN RIGHT WRIST: ICD-10-CM

## 2025-01-17 DIAGNOSIS — S52.601A CLOSED FRACTURE OF DISTAL ENDS OF RIGHT RADIUS AND ULNA, INITIAL ENCOUNTER: Primary | ICD-10-CM

## 2025-01-17 DIAGNOSIS — S52.501A CLOSED FRACTURE OF DISTAL ENDS OF RIGHT RADIUS AND ULNA, INITIAL ENCOUNTER: ICD-10-CM

## 2025-01-17 PROCEDURE — 73110 X-RAY EXAM OF WRIST: CPT

## 2025-01-17 PROCEDURE — 99214 OFFICE O/P EST MOD 30 MIN: CPT | Performed by: STUDENT IN AN ORGANIZED HEALTH CARE EDUCATION/TRAINING PROGRAM
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
## 2025-01-21 ENCOUNTER — OFFICE VISIT (OUTPATIENT)
Dept: OBGYN CLINIC | Facility: CLINIC | Age: 60
End: 2025-01-21
Payer: COMMERCIAL

## 2025-01-21 ENCOUNTER — HOSPITAL ENCOUNTER (OUTPATIENT)
Dept: RADIOLOGY | Facility: CLINIC | Age: 60
Discharge: HOME | End: 2025-01-21
Payer: COMMERCIAL

## 2025-01-21 VITALS — BODY MASS INDEX: 23.54 KG/M2 | HEIGHT: 69 IN | WEIGHT: 158.95 LBS

## 2025-01-21 DIAGNOSIS — S52.601D CLOSED FRACTURE OF DISTAL ENDS OF RIGHT RADIUS AND ULNA WITH ROUTINE HEALING, SUBSEQUENT ENCOUNTER: Primary | ICD-10-CM

## 2025-01-21 DIAGNOSIS — S52.501D CLOSED FRACTURE OF DISTAL ENDS OF RIGHT RADIUS AND ULNA WITH ROUTINE HEALING, SUBSEQUENT ENCOUNTER: ICD-10-CM

## 2025-01-21 DIAGNOSIS — S52.601D CLOSED FRACTURE OF DISTAL ENDS OF RIGHT RADIUS AND ULNA WITH ROUTINE HEALING, SUBSEQUENT ENCOUNTER: ICD-10-CM

## 2025-01-21 DIAGNOSIS — S52.501D CLOSED FRACTURE OF DISTAL ENDS OF RIGHT RADIUS AND ULNA WITH ROUTINE HEALING, SUBSEQUENT ENCOUNTER: Primary | ICD-10-CM

## 2025-01-21 PROCEDURE — 73110 X-RAY EXAM OF WRIST: CPT

## 2025-01-21 PROCEDURE — 99213 OFFICE O/P EST LOW 20 MIN: CPT | Performed by: STUDENT IN AN ORGANIZED HEALTH CARE EDUCATION/TRAINING PROGRAM

## 2025-01-21 NOTE — PROGRESS NOTES
Orthopedic Surgery Management Note  Jonna Mccullough (59 y.o. female)  : 1965 Encounter Date: 2025    Assessment/Plan:  The patient is a 59-year-old female now 1 weeks out from a right distal radius fracture.  At the last visit we had an extensive conversation about the fact that although this appears to be primarily an extra articular fracture, it does have a vertical sheer component similar to a Bartons fracture. Therefore it is more unstable than a classic colles fracture with increased risk for displacement. Often times Bartons fractures are treated surgically, whether or not they are currently displaced. this is due to the risk of displacement. However, today on imaging, there does not appear to be any displacement of the fracture from prior imaging demonstrating at least for the past week it has been stable. Then considering the fracture alignment, I had an extensive conversation with the patient. Between last week imaging and this week imaging there has been a mild loss of radial height of maybe 1 to 2 mm and some mild loss of radio inclination of a few degrees although this is difficult to fully appreciate due to the overly splint material. I would say that her radial inclination is not less than 15°. Additionally, she is still ulnar negative variance. Therefore, by AAOS guidelines, she does not meet any discrete criteria for fixation. We did revisit that her carpus is shifted volar to the axis of the radial shaft however, when comparing this [id number] with the contralateral wrist, you can see that the alignment is almost identical to the uninjured, contralateral wrist, which sits volarly as well.  Therefore I think overall the fracture has had a minimal volar shift and this has not changed from last week's imaging.    After discussion of all these findings the patient and her  have again decided to continue with conservative management.  We will continue the splint for 1 more week and  next week we will convert her to a cast provided everything is stable.  She understands that her fracture is still at risk for displacement and may require surgery in the future if it does become significantly displaced.  Overall her pain is minimal and she has no numbness or tingling.  She is very happy with her care at this time.    Immobilization: maintain splint on right upper extremity  Maintain the injured extremity nonweightbearing.  The splint should be maintained clean and dry.  Elevate the injured extremity to heart level for slightly above.  This will help with the swelling.  Tylenol and oral anti-inflammatories can be taken for pain. The patient was advised that NSAID-type medications have some very important potential side effects including: gastrointestinal irritation including hemorrhage and renal injuries.   Next Visit:  Return in about 1 week (around 1/28/2025)., with new x-rays of the wrist in the splint      Subjective:  59 y.o. female presenting to the office for 1 week follow up, status post right distal radius fracture. They have been immobilized in a splint/cast since the injury.  DOI: 1/15/2025    Patient states that their pain is present - adequately treated. Patient reports pain is on the ulnar aspect of the wrist. Patient has maintained splint as directed.     Review of Systems  Review of systems negative unless otherwise specified in HPI    Past Medical History  No past medical history on file.  Past Surgical History  No past surgical history on file.  Current Medications  Current Outpatient Medications on File Prior to Visit   Medication Sig Dispense Refill    ibuprofen (MOTRIN) 800 mg tablet Take 1 tablet (800 mg total) by mouth 3 (three) times a day 30 tablet 0    ondansetron (ZOFRAN) 4 mg tablet Take 1 tablet (4 mg total) by mouth every 8 (eight) hours as needed for nausea or vomiting 10 tablet 0    oxyCODONE (Roxicodone) 5 immediate release tablet Take 1-2 tablets (5-10 mg total) by  mouth every 6 (six) hours as needed for moderate pain Max Daily Amount: 40 mg 30 tablet 0    [] oxyCODONE-acetaminophen (Percocet) 5-325 mg per tablet Take 1 tablet by mouth every 8 (eight) hours as needed for severe pain for up to 5 days Max Daily Amount: 3 tablets 15 tablet 0     No current facility-administered medications on file prior to visit.       Physical exam  Exam: bilateral, right wrist  Cast/splint is in place, clean and dry. No irritation at the edges  Range of Motion: deferred  Neurovascular status: Neuro intact        Imaging:  Study type: XRAY right wrist(s)  Date: 2025  I have personally reviewed the imaging in PACS and my impression is as follows: relatively maintained alignment of distal radius fracture without significant displacement compared to prior films.     Scribe Attestation      I,:  Olimpia Ann PA-C am acting as a scribe while in the presence of the attending physician.:       I,:  Peyman Coello MD personally performed the services described in this documentation    as scribed in my presence.:

## 2025-01-21 NOTE — PROGRESS NOTES
The patient is a 59-year-old female now 2 weeks out from a right distal radius fracture.  At the last visit we had an extensive conversation about the fact that although this appears to be primarily an extra particular fracture, it does have a vertical sheer component similar to a Bartons fracture. Therefore it is more unstable than a classic fracture with risk for displacement. Often times Bartons fractures are treated surgically, whether or not they are currently displaced. this is due to the risk of displacement. However, today on imaging, they’re just not appear to be any displacement of the fracture from prior imaging demonstrating at least for the past week it has been stable. Then considering the fracture alignment, I had an extensive conversation with the patient. Between last week imaging and this week imaging there has been a mild loss of radial height of maybe 1 to 2 mm and some mild loss of radio inclination of a few degrees although this is difficult to fully appreciate due to the overly splint material. I would say that her radial inclination is not less than 15°. Additionally, she is still ulnar variance. Therefore, by AAOS guidelines, she does not meet any discrete criteria for fixation. We can revisit it that her carpet is shifted Voeller to the axis of the radial shaft however, when comparing this [id number] with the contralateral wrist, you can see that the alignment is almost identical to the uninjured, contralateral wrist, which sits volarly as well.  Therefore I think overall the fracture has had a minimal volar shift and this has not changed from last week's imaging.    After discussion of all these findings the patient and her  have again decided to continue with conservative management.  We will continue the splint for 1 more week and next week we will convert her to a cast provided everything is stable.  She understands that her fracture is still at risk for displacement and may  require surgery in the future if it does become significantly displaced.  Overall her pain is minimal and she has no numbness or tingling.  She is very happy with her care at this time.

## 2025-01-28 ENCOUNTER — OFFICE VISIT (OUTPATIENT)
Dept: OBGYN CLINIC | Facility: CLINIC | Age: 60
End: 2025-01-28
Payer: COMMERCIAL

## 2025-01-28 ENCOUNTER — HOSPITAL ENCOUNTER (OUTPATIENT)
Dept: RADIOLOGY | Facility: CLINIC | Age: 60
Discharge: HOME | End: 2025-01-28
Payer: COMMERCIAL

## 2025-01-28 VITALS — BODY MASS INDEX: 23.85 KG/M2 | WEIGHT: 161 LBS | HEIGHT: 69 IN

## 2025-01-28 DIAGNOSIS — S52.601D CLOSED FRACTURE OF DISTAL ENDS OF RIGHT RADIUS AND ULNA WITH ROUTINE HEALING, SUBSEQUENT ENCOUNTER: Primary | ICD-10-CM

## 2025-01-28 DIAGNOSIS — S52.601D CLOSED FRACTURE OF DISTAL ENDS OF RIGHT RADIUS AND ULNA WITH ROUTINE HEALING, SUBSEQUENT ENCOUNTER: ICD-10-CM

## 2025-01-28 DIAGNOSIS — S52.501D CLOSED FRACTURE OF DISTAL ENDS OF RIGHT RADIUS AND ULNA WITH ROUTINE HEALING, SUBSEQUENT ENCOUNTER: ICD-10-CM

## 2025-01-28 DIAGNOSIS — S52.501D CLOSED FRACTURE OF DISTAL ENDS OF RIGHT RADIUS AND ULNA WITH ROUTINE HEALING, SUBSEQUENT ENCOUNTER: Primary | ICD-10-CM

## 2025-01-28 PROCEDURE — 99213 OFFICE O/P EST LOW 20 MIN: CPT | Performed by: STUDENT IN AN ORGANIZED HEALTH CARE EDUCATION/TRAINING PROGRAM

## 2025-01-28 PROCEDURE — 73110 X-RAY EXAM OF WRIST: CPT

## 2025-01-28 NOTE — PROGRESS NOTES
Orthopedic Surgery Management Note  Jonna Mccullough (59 y.o. female)  : 1965 Encounter Date: 2025    Assessment/Plan:  59 y.o. female 2 weeks follow up status post closed reduction and immobilization for a right distal radius fracture. The fracture has been maintained in acceptable alignment and the patient is tolerating the immobilization well.     Xrays of right wrist were obtained and reviewed in the office today.   Immobilization: maintain splint on right upper extremity  Maintain the injured extremity nonweightbearing.  The splint should be maintained clean and dry.  Elevate the injured extremity to heart level for slightly above.  This will help with the swelling.  Tylenol and oral anti-inflammatories can be taken for pain. The patient was advised that NSAID-type medications have some very important potential side effects including: gastrointestinal irritation including hemorrhage and renal injuries.   Next Visit:  Return in about 1 week (around 2025)., with new x-rays of the wrist in the splint. Will change splint to cast next week.       Subjective:  59 y.o. female presenting to the office for 2 weeks follow up, status post right distal radius fracture. They have been immobilized in a splint/cast since the injury.  DOI: 1/15/2025    Patient states that their pain is present - adequately treated. Patient has maintained splint as directed.     Review of Systems  Review of systems negative unless otherwise specified in HPI    Past Medical History  History reviewed. No pertinent past medical history.  Past Surgical History  History reviewed. No pertinent surgical history.  Current Medications  Current Outpatient Medications on File Prior to Visit   Medication Sig Dispense Refill    ibuprofen (MOTRIN) 800 mg tablet Take 1 tablet (800 mg total) by mouth 3 (three) times a day 30 tablet 0    ondansetron (ZOFRAN) 4 mg tablet Take 1 tablet (4 mg total) by mouth every 8 (eight) hours as needed for  nausea or vomiting 10 tablet 0    oxyCODONE (Roxicodone) 5 immediate release tablet Take 1-2 tablets (5-10 mg total) by mouth every 6 (six) hours as needed for moderate pain Max Daily Amount: 40 mg 30 tablet 0     No current facility-administered medications on file prior to visit.       Physical exam  Exam: right wrist  Cast/splint is in place, clean and dry. No irritation at the edges  Range of Motion: deferred  Neurovascular status: Neuro intact        Imaging:  Study type: XRAY right wrist(s)  Date: 1/28/2025  I have personally reviewed the imaging in PACS and my impression is as follows: maintained alignment of distal radius fracture without further displacement compared to prior films.     Scribe Attestation      I,:  Olimpia Ann PA-C am acting as a scribe while in the presence of the attending physician.:       I,:  Peyman Coello MD personally performed the services described in this documentation    as scribed in my presence.:

## 2025-02-06 ENCOUNTER — OFFICE VISIT (OUTPATIENT)
Dept: OBGYN CLINIC | Facility: CLINIC | Age: 60
End: 2025-02-06
Payer: COMMERCIAL

## 2025-02-06 ENCOUNTER — HOSPITAL ENCOUNTER (OUTPATIENT)
Dept: RADIOLOGY | Facility: CLINIC | Age: 60
End: 2025-02-06
Payer: COMMERCIAL

## 2025-02-06 VITALS — BODY MASS INDEX: 23.84 KG/M2 | HEIGHT: 69 IN | WEIGHT: 160.94 LBS

## 2025-02-06 DIAGNOSIS — S52.601D CLOSED FRACTURE OF DISTAL ENDS OF RIGHT RADIUS AND ULNA WITH ROUTINE HEALING, SUBSEQUENT ENCOUNTER: ICD-10-CM

## 2025-02-06 DIAGNOSIS — S52.601D CLOSED FRACTURE OF DISTAL ENDS OF RIGHT RADIUS AND ULNA WITH ROUTINE HEALING, SUBSEQUENT ENCOUNTER: Primary | ICD-10-CM

## 2025-02-06 DIAGNOSIS — S52.501D CLOSED FRACTURE OF DISTAL ENDS OF RIGHT RADIUS AND ULNA WITH ROUTINE HEALING, SUBSEQUENT ENCOUNTER: ICD-10-CM

## 2025-02-06 DIAGNOSIS — S52.501D CLOSED FRACTURE OF DISTAL ENDS OF RIGHT RADIUS AND ULNA WITH ROUTINE HEALING, SUBSEQUENT ENCOUNTER: Primary | ICD-10-CM

## 2025-02-06 PROCEDURE — 73110 X-RAY EXAM OF WRIST: CPT

## 2025-02-06 PROCEDURE — 29075 APPL CST ELBW FNGR SHORT ARM: CPT | Performed by: STUDENT IN AN ORGANIZED HEALTH CARE EDUCATION/TRAINING PROGRAM

## 2025-02-06 PROCEDURE — 99213 OFFICE O/P EST LOW 20 MIN: CPT | Performed by: STUDENT IN AN ORGANIZED HEALTH CARE EDUCATION/TRAINING PROGRAM

## 2025-02-06 RX ORDER — OXYCODONE HYDROCHLORIDE 5 MG/1
5 TABLET ORAL EVERY 4 HOURS PRN
Qty: 5 TABLET | Refills: 0 | Status: SHIPPED | OUTPATIENT
Start: 2025-02-06

## 2025-02-06 RX ORDER — ACETAMINOPHEN 325 MG/1
325 TABLET ORAL EVERY 6 HOURS PRN
COMMUNITY

## 2025-02-06 NOTE — PROGRESS NOTES
Orthopedic Surgery Management Note  Jonna Mccullough (59 y.o. female)  : 1965 Encounter Date: 2025    Assessment/Plan:  59 y.o. female 3 weeks follow up status post closed reduction and immobilization for a right distal radius fracture. The fracture has been maintained in acceptable alignment and the patient is tolerating the immobilization well.  She was converted to a short arm cast today.  This will remain in place for 3 weeks.    Xrays of right wrist were obtained and reviewed in the office today.   Immobilization: placed in a short arm cast at today's visit  Maintain the injured extremity nonweightbearing.  The cast should be maintained clean and dry.  Elevate the injured extremity to heart level for slightly above.  This will help with the swelling.  Tylenol and oral anti-inflammatories can be taken for pain. The patient was advised that NSAID-type medications have some very important potential side effects including: gastrointestinal irritation including hemorrhage and renal injuries.   Next Visit:  w follow-up in 3 weeks, cast can be removed and then patient should go for x-rays      Subjective:  59 y.o. female presenting to the office for 3 weeks follow up, status post right distal radius fracture. They have been immobilized in a splint/cast since the injury.  DOI: 1/15/2025    Patient states that their pain is present - adequately treated. Patient has maintained splint as directed.     Review of Systems  Review of systems negative unless otherwise specified in HPI    Past Medical History  History reviewed. No pertinent past medical history.  Past Surgical History  History reviewed. No pertinent surgical history.  Current Medications  Current Outpatient Medications on File Prior to Visit   Medication Sig Dispense Refill    acetaminophen (TYLENOL) 325 mg tablet Take 325 mg by mouth every 6 (six) hours as needed for mild pain      ibuprofen (MOTRIN) 800 mg tablet Take 1 tablet (800 mg total) by  mouth 3 (three) times a day 30 tablet 0    oxyCODONE (Roxicodone) 5 immediate release tablet Take 1-2 tablets (5-10 mg total) by mouth every 6 (six) hours as needed for moderate pain Max Daily Amount: 40 mg 30 tablet 0    ondansetron (ZOFRAN) 4 mg tablet Take 1 tablet (4 mg total) by mouth every 8 (eight) hours as needed for nausea or vomiting (Patient not taking: Reported on 2/6/2025) 10 tablet 0     No current facility-administered medications on file prior to visit.       Physical exam  Exam: right wrist  Cast/splint is in place, clean and dry. No irritation at the edges  Cast removed.  Skin intact without irritation  Range of Motion: deferred  Neurovascular status: Neuro intact        Imaging:  Study type: XRAY right wrist(s)  Date: 1/28/2025  I have personally reviewed the imaging in PACS and my impression is as follows: maintained alignment of distal radius fracture without further displacement compared to prior films.   Date: 2/6/2025  I have personally reviewed the imaging in PACS and my impression is as follows: Distal radius fracture with maintained alignment from prior imaging    Procedure:  Cast application    Date/Time: 2/6/2025 1:45 PM    Performed by: Peyman Coello MD  Authorized by: Peyman Coello MD  Universal Protocol:  Consent: Verbal consent obtained.  Risks and benefits: risks, benefits and alternatives were discussed  Consent given by: patient  Timeout called at: 2/6/2025 2:31 PM.  Patient understanding: patient states understanding of the procedure being performed  Patient consent: the patient's understanding of the procedure matches consent given  Patient identity confirmed: verbally with patient    Pre-procedure details:     Sensation:  Normal  Procedure details:     Laterality:  Right    Location:  Wrist    Wrist:  R wristCast type:  Short arm        Supplies:  Cotton padding and Ortho-Glass  Post-procedure details:     Sensation:  Normal    Patient tolerance of procedure:  Tolerated well,  no immediate complications          Scribe Attestation      I,:  Marsha Marquez am acting as a scribe while in the presence of the attending physician.:       I,:  Peyman Coello MD personally performed the services described in this documentation    as scribed in my presence.:

## 2025-02-27 ENCOUNTER — OFFICE VISIT (OUTPATIENT)
Dept: OBGYN CLINIC | Facility: CLINIC | Age: 60
End: 2025-02-27
Payer: COMMERCIAL

## 2025-02-27 ENCOUNTER — HOSPITAL ENCOUNTER (OUTPATIENT)
Dept: RADIOLOGY | Facility: CLINIC | Age: 60
End: 2025-02-27
Payer: COMMERCIAL

## 2025-02-27 VITALS — HEIGHT: 69 IN | BODY MASS INDEX: 23.84 KG/M2 | WEIGHT: 160.94 LBS

## 2025-02-27 DIAGNOSIS — S52.501D CLOSED FRACTURE OF DISTAL ENDS OF RIGHT RADIUS AND ULNA WITH ROUTINE HEALING, SUBSEQUENT ENCOUNTER: Primary | ICD-10-CM

## 2025-02-27 DIAGNOSIS — S52.601D CLOSED FRACTURE OF DISTAL ENDS OF RIGHT RADIUS AND ULNA WITH ROUTINE HEALING, SUBSEQUENT ENCOUNTER: Primary | ICD-10-CM

## 2025-02-27 DIAGNOSIS — S52.601D CLOSED FRACTURE OF DISTAL ENDS OF RIGHT RADIUS AND ULNA WITH ROUTINE HEALING, SUBSEQUENT ENCOUNTER: ICD-10-CM

## 2025-02-27 DIAGNOSIS — S52.501D CLOSED FRACTURE OF DISTAL ENDS OF RIGHT RADIUS AND ULNA WITH ROUTINE HEALING, SUBSEQUENT ENCOUNTER: ICD-10-CM

## 2025-02-27 PROCEDURE — 99213 OFFICE O/P EST LOW 20 MIN: CPT | Performed by: STUDENT IN AN ORGANIZED HEALTH CARE EDUCATION/TRAINING PROGRAM

## 2025-02-27 PROCEDURE — 73110 X-RAY EXAM OF WRIST: CPT

## 2025-02-27 NOTE — PROGRESS NOTES
Orthopedic Surgery Management Note  Jonna Mccullough (59 y.o. female)  : 1965 Encounter Date: 2025    Assessment/Plan:  59 y.o. female 6 weeks follow up status post closed reduction and immobilization for a right distal radius fracture. The fracture has been maintained in acceptable alignment and the patient is tolerating the immobilization well.  She can begin protected range of motion    Xrays of right wrist were obtained and reviewed in the office today.   She was placed in a cock-up wrist brace to be worn for 23 hours a day. She may gradually wean out of the brace.   She was provided home exercises. A referral was placed of OT/PT as well.  Tylenol and oral anti-inflammatories can be taken for pain. The patient was advised that NSAID-type medications have some very important potential side effects including: gastrointestinal irritation including hemorrhage and renal injuries.   Next Visit: Return in about 6 weeks (around 4/10/2025).      Subjective:  59 y.o. female presenting to the office for 6 weeks follow up, status post right distal radius fracture. They have been immobilized in a splint/cast since the injury. She was last seen on 2025 at which time she was transitioned to a short arm cast. She was to continue nonweightbearing. Today, she notes minimal pain. She states it feels stiff now with the cast removed. She denies numbness and tingling.     DOI: 1/15/2025        Review of Systems  Review of systems negative unless otherwise specified in HPI    Past Medical History  History reviewed. No pertinent past medical history.  Past Surgical History  History reviewed. No pertinent surgical history.  Current Medications  Current Outpatient Medications on File Prior to Visit   Medication Sig Dispense Refill    acetaminophen (TYLENOL) 325 mg tablet Take 325 mg by mouth every 6 (six) hours as needed for mild pain      ibuprofen (MOTRIN) 800 mg tablet Take 1 tablet (800 mg total) by mouth 3 (three)  times a day 30 tablet 0    ondansetron (ZOFRAN) 4 mg tablet Take 1 tablet (4 mg total) by mouth every 8 (eight) hours as needed for nausea or vomiting (Patient not taking: Reported on 2/27/2025) 10 tablet 0    oxyCODONE (Roxicodone) 5 immediate release tablet Take 1-2 tablets (5-10 mg total) by mouth every 6 (six) hours as needed for moderate pain Max Daily Amount: 40 mg (Patient not taking: Reported on 2/27/2025) 30 tablet 0    oxyCODONE (Roxicodone) 5 immediate release tablet Take 1 tablet (5 mg total) by mouth every 4 (four) hours as needed for moderate pain Max Daily Amount: 30 mg (Patient not taking: Reported on 2/27/2025) 5 tablet 0     No current facility-administered medications on file prior to visit.       Physical exam  Exam: right wrist  Cast/splint is in place, clean and dry. No irritation at the edges  Cast removed.  Skin intact without irritation  Range of Motion: expected stiffness but minimal pain at the wrist  Neurovascular status: Neuro intact        Imaging:  Study type: XRAY right wrist(s)  Date: 1/28/2025  I have personally reviewed the imaging in PACS and my impression is as follows: maintained alignment of distal radius fracture without further displacement compared to prior films.   Date: 2/6/2025  I have personally reviewed the imaging in PACS and my impression is as follows: Distal radius fracture with maintained alignment from prior imaging    Procedure:  Procedures        Scribe Attestation      I,:  Marsha Marquez am acting as a scribe while in the presence of the attending physician.:       I,:  Peyman Coello MD personally performed the services described in this documentation    as scribed in my presence.:

## 2025-02-27 NOTE — PATIENT INSTRUCTIONS
For the next two weeks at least, use the brace on your injured wrist. Use it longer than two weeks if you continue to have pain. During this time, you should remove the brace 4-5 times a day and practice exercises for your wrist. There are 5 exercises (see below). You can take the brace off to shower and wash hands as well. Do not lift more than 5 lbs in the next four weeks.    Exercises:  - Keep elbow at the side. Rotate palm up and palm down. Assist the rotation with your uninjured hand. Hold for 30 seconds at the point where you feel tightness and minimal pain.  - Keep elbow on the table. Flex wrist down and back. Assist the flexion and back bending with your uninjured hand. Hold for 30 seconds at the point where you feel tightness and minimal pain.  - Clench fist on the injured hand. Assist the clench with your uninjured hand. Hold for 30 seconds at the point where you feel tightness and minimal pain.

## 2025-03-13 ENCOUNTER — TELEPHONE (OUTPATIENT)
Dept: ADMINISTRATIVE | Facility: OTHER | Age: 60
End: 2025-03-13

## 2025-03-13 ENCOUNTER — OFFICE VISIT (OUTPATIENT)
Dept: FAMILY MEDICINE CLINIC | Facility: CLINIC | Age: 60
End: 2025-03-13
Payer: COMMERCIAL

## 2025-03-13 VITALS
DIASTOLIC BLOOD PRESSURE: 72 MMHG | HEIGHT: 69 IN | HEART RATE: 90 BPM | SYSTOLIC BLOOD PRESSURE: 127 MMHG | OXYGEN SATURATION: 98 % | BODY MASS INDEX: 22.93 KG/M2 | WEIGHT: 154.8 LBS

## 2025-03-13 DIAGNOSIS — Z78.0 POST-MENOPAUSAL: ICD-10-CM

## 2025-03-13 DIAGNOSIS — Z00.00 ANNUAL PHYSICAL EXAM: Primary | ICD-10-CM

## 2025-03-13 DIAGNOSIS — Z01.419 GYNECOLOGIC EXAM NORMAL: ICD-10-CM

## 2025-03-13 DIAGNOSIS — Z13.220 LIPID SCREENING: ICD-10-CM

## 2025-03-13 DIAGNOSIS — Z01.419 ENCOUNTER FOR GYNECOLOGICAL EXAMINATION WITHOUT ABNORMAL FINDING: ICD-10-CM

## 2025-03-13 PROCEDURE — 99386 PREV VISIT NEW AGE 40-64: CPT | Performed by: FAMILY MEDICINE

## 2025-03-13 PROCEDURE — 93000 ELECTROCARDIOGRAM COMPLETE: CPT | Performed by: FAMILY MEDICINE

## 2025-03-13 NOTE — TELEPHONE ENCOUNTER
----- Message from Savannah STALEY sent at 3/13/2025  9:24 AM EDT -----  Regarding: Care Gap request  03/13/25 9:24 AM    Hello, our patient attached above has had CRC: Colonoscopy completed/performed. Please assist in updating the patient chart by pulling the Care Everywhere (CE) document. The date of service is 2023.     Thank you,  Savannah Truong  [facility] PRIMARY [facility]
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
## 2025-03-13 NOTE — ASSESSMENT & PLAN NOTE
Reviewed age-appropriate health maintenance and preventive care.      Orders:    POCT ECG    CBC and differential; Future    Comprehensive metabolic panel; Future    Lipid panel; Future    LDL cholesterol, direct; Future

## 2025-03-13 NOTE — LETTER
Procedure Request Form: Colonoscopy      Date Requested: 25  Patient: Jonna Mccullough  Patient : 1965   Referring Provider: Robert Budinetz, MD        Date of Procedure ______________________________       The above patient has informed us that they have completed their   most recent Colonoscopy at your facility. Please complete   this form and attach all corresponding procedure reports/results.    Comments __________________________________________________________  ____________________________________________________________________  ____________________________________________________________________  ____________________________________________________________________    Facility Completing Procedure _________________________________________    Form Completed By (print name) _______________________________________      Signature __________________________________________________________      These reports are needed for  compliance.    Please fax this completed form and a copy of the procedure report to our office located at [address] [place] [address] as soon as possible to Fax [phone] attention Rj: Phone [phone]    We thank you for your assistance in treating our mutual patient.

## 2025-03-13 NOTE — LETTER
Procedure Request Form: Colonoscopy      Date Requested: 25  Patient: Jonna Mccullough  Patient : 1965   Referring Provider: Robert Budinetz, MD        Date of Procedure ______________________________       The above patient has informed us that they have completed their   most recent Colonoscopy at your facility. Please complete   this form and attach all corresponding procedure reports/results.    Comments __________________________________________________________  ____________________________________________________________________  ____________________________________________________________________  ____________________________________________________________________    Facility Completing Procedure _________________________________________    Form Completed By (print name) _______________________________________      Signature __________________________________________________________      These reports are needed for  compliance.    Please fax this completed form and a copy of the procedure report to our office located at [address] [place] [address] as soon as possible to Fax [phone] attention Rj: Phone [phone]    We thank you for your assistance in treating our mutual patient.

## 2025-03-14 NOTE — TELEPHONE ENCOUNTER
Upon review of the In Basket request and the patient's chart, initial outreach has been made via fax to facility. Please see Contacts section for details.     Thank you  FLAVIO GAINES MA

## 2025-03-19 NOTE — TELEPHONE ENCOUNTER
As a follow-up, a second attempt has been made for outreach via fax to facility. Please see Contacts section for details.    Dragan Wiggins   [phone]     Thank you  FLAVIO GAINES MA

## 2025-03-24 NOTE — TELEPHONE ENCOUNTER
As a final attempt, a third outreach has been made via telephone call to facility. Please see Contacts section for details. This encounter will be closed and completed by end of day. Should we receive the requested information because of previous outreach attempts, the requested patient's chart will be updated appropriately.     Thank you  FLAVIO GAINES MA

## 2025-03-31 ENCOUNTER — HOSPITAL ENCOUNTER (OUTPATIENT)
Dept: RADIOLOGY | Facility: CLINIC | Age: 60
Discharge: HOME | End: 2025-03-31
Payer: COMMERCIAL

## 2025-03-31 VITALS — HEIGHT: 67 IN | WEIGHT: 154.8 LBS | BODY MASS INDEX: 24.3 KG/M2

## 2025-03-31 DIAGNOSIS — S52.501A CLOSED FRACTURE OF DISTAL ENDS OF RIGHT RADIUS AND ULNA, INITIAL ENCOUNTER: ICD-10-CM

## 2025-03-31 DIAGNOSIS — S52.601A CLOSED FRACTURE OF DISTAL ENDS OF RIGHT RADIUS AND ULNA, INITIAL ENCOUNTER: ICD-10-CM

## 2025-03-31 PROCEDURE — 77080 DXA BONE DENSITY AXIAL: CPT

## 2025-04-08 ENCOUNTER — APPOINTMENT (OUTPATIENT)
Dept: LAB | Facility: CLINIC | Age: 60
End: 2025-04-08
Payer: COMMERCIAL

## 2025-04-08 DIAGNOSIS — Z13.220 LIPID SCREENING: ICD-10-CM

## 2025-04-08 DIAGNOSIS — Z00.00 ANNUAL PHYSICAL EXAM: ICD-10-CM

## 2025-04-08 PROCEDURE — 83721 ASSAY OF BLOOD LIPOPROTEIN: CPT

## 2025-04-08 PROCEDURE — 80053 COMPREHEN METABOLIC PANEL: CPT

## 2025-04-08 PROCEDURE — 85025 COMPLETE CBC W/AUTO DIFF WBC: CPT

## 2025-04-08 PROCEDURE — 80061 LIPID PANEL: CPT

## 2025-04-10 ENCOUNTER — HOSPITAL ENCOUNTER (OUTPATIENT)
Dept: RADIOLOGY | Facility: CLINIC | Age: 60
End: 2025-04-10
Payer: COMMERCIAL

## 2025-04-10 ENCOUNTER — APPOINTMENT (OUTPATIENT)
Dept: LAB | Facility: HOSPITAL | Age: 60
End: 2025-04-10
Payer: COMMERCIAL

## 2025-04-10 ENCOUNTER — RESULTS FOLLOW-UP (OUTPATIENT)
Dept: FAMILY MEDICINE CLINIC | Facility: CLINIC | Age: 60
End: 2025-04-10

## 2025-04-10 ENCOUNTER — OFFICE VISIT (OUTPATIENT)
Dept: OBGYN CLINIC | Facility: CLINIC | Age: 60
End: 2025-04-10
Payer: COMMERCIAL

## 2025-04-10 VITALS — BODY MASS INDEX: 24.08 KG/M2 | HEIGHT: 67 IN | WEIGHT: 153.4 LBS

## 2025-04-10 DIAGNOSIS — S52.601D CLOSED FRACTURE OF DISTAL ENDS OF RIGHT RADIUS AND ULNA WITH ROUTINE HEALING, SUBSEQUENT ENCOUNTER: ICD-10-CM

## 2025-04-10 DIAGNOSIS — S52.501D CLOSED FRACTURE OF DISTAL ENDS OF RIGHT RADIUS AND ULNA WITH ROUTINE HEALING, SUBSEQUENT ENCOUNTER: ICD-10-CM

## 2025-04-10 DIAGNOSIS — S52.501D CLOSED FRACTURE OF DISTAL ENDS OF RIGHT RADIUS AND ULNA WITH ROUTINE HEALING, SUBSEQUENT ENCOUNTER: Primary | ICD-10-CM

## 2025-04-10 DIAGNOSIS — S52.601D CLOSED FRACTURE OF DISTAL ENDS OF RIGHT RADIUS AND ULNA WITH ROUTINE HEALING, SUBSEQUENT ENCOUNTER: Primary | ICD-10-CM

## 2025-04-10 LAB
ALBUMIN SERPL BCG-MCNC: 4.8 G/DL (ref 3.5–5)
ALP SERPL-CCNC: 60 U/L (ref 34–104)
ALT SERPL W P-5'-P-CCNC: 11 U/L (ref 7–52)
ANION GAP SERPL CALCULATED.3IONS-SCNC: 8 MMOL/L (ref 4–13)
AST SERPL W P-5'-P-CCNC: 15 U/L (ref 13–39)
BASOPHILS # BLD AUTO: 0.03 THOUSANDS/ÂΜL (ref 0–0.1)
BASOPHILS NFR BLD AUTO: 0 % (ref 0–1)
BILIRUB SERPL-MCNC: 0.95 MG/DL (ref 0.2–1)
BUN SERPL-MCNC: 9 MG/DL (ref 5–25)
CALCIUM SERPL-MCNC: 9.6 MG/DL (ref 8.4–10.2)
CHLORIDE SERPL-SCNC: 103 MMOL/L (ref 96–108)
CHOLEST SERPL-MCNC: 209 MG/DL (ref ?–200)
CO2 SERPL-SCNC: 28 MMOL/L (ref 21–32)
CREAT SERPL-MCNC: 0.68 MG/DL (ref 0.6–1.3)
EOSINOPHIL # BLD AUTO: 0.13 THOUSAND/ÂΜL (ref 0–0.61)
EOSINOPHIL NFR BLD AUTO: 2 % (ref 0–6)
ERYTHROCYTE [DISTWIDTH] IN BLOOD BY AUTOMATED COUNT: 12.7 % (ref 11.6–15.1)
GFR SERPL CREATININE-BSD FRML MDRD: 95 ML/MIN/1.73SQ M
GLUCOSE P FAST SERPL-MCNC: 94 MG/DL (ref 65–99)
HCT VFR BLD AUTO: 42.5 % (ref 34.8–46.1)
HDLC SERPL-MCNC: 86 MG/DL
HGB BLD-MCNC: 14 G/DL (ref 11.5–15.4)
IMM GRANULOCYTES # BLD AUTO: 0.02 THOUSAND/UL (ref 0–0.2)
IMM GRANULOCYTES NFR BLD AUTO: 0 % (ref 0–2)
LDLC SERPL CALC-MCNC: 109 MG/DL (ref 0–100)
LDLC SERPL DIRECT ASSAY-MCNC: 112 MG/DL (ref 0–100)
LYMPHOCYTES # BLD AUTO: 2.72 THOUSANDS/ÂΜL (ref 0.6–4.47)
LYMPHOCYTES NFR BLD AUTO: 38 % (ref 14–44)
MCH RBC QN AUTO: 29.5 PG (ref 26.8–34.3)
MCHC RBC AUTO-ENTMCNC: 32.9 G/DL (ref 31.4–37.4)
MCV RBC AUTO: 90 FL (ref 82–98)
MONOCYTES # BLD AUTO: 0.54 THOUSAND/ÂΜL (ref 0.17–1.22)
MONOCYTES NFR BLD AUTO: 8 % (ref 4–12)
NEUTROPHILS # BLD AUTO: 3.66 THOUSANDS/ÂΜL (ref 1.85–7.62)
NEUTS SEG NFR BLD AUTO: 52 % (ref 43–75)
NONHDLC SERPL-MCNC: 123 MG/DL
NRBC BLD AUTO-RTO: 0 /100 WBCS
PLATELET # BLD AUTO: 341 THOUSANDS/UL (ref 149–390)
PMV BLD AUTO: 9.7 FL (ref 8.9–12.7)
POTASSIUM SERPL-SCNC: 4.1 MMOL/L (ref 3.5–5.3)
PROT SERPL-MCNC: 7.4 G/DL (ref 6.4–8.4)
RBC # BLD AUTO: 4.74 MILLION/UL (ref 3.81–5.12)
SODIUM SERPL-SCNC: 139 MMOL/L (ref 135–147)
TRIGL SERPL-MCNC: 71 MG/DL (ref ?–150)
WBC # BLD AUTO: 7.1 THOUSAND/UL (ref 4.31–10.16)

## 2025-04-10 PROCEDURE — 99213 OFFICE O/P EST LOW 20 MIN: CPT | Performed by: STUDENT IN AN ORGANIZED HEALTH CARE EDUCATION/TRAINING PROGRAM

## 2025-04-10 PROCEDURE — 36415 COLL VENOUS BLD VENIPUNCTURE: CPT

## 2025-04-10 PROCEDURE — 73110 X-RAY EXAM OF WRIST: CPT

## 2025-04-16 ENCOUNTER — ANNUAL EXAM (OUTPATIENT)
Dept: OBGYN CLINIC | Facility: CLINIC | Age: 60
End: 2025-04-16

## 2025-04-16 VITALS
WEIGHT: 153.6 LBS | HEIGHT: 67 IN | SYSTOLIC BLOOD PRESSURE: 118 MMHG | BODY MASS INDEX: 24.11 KG/M2 | DIASTOLIC BLOOD PRESSURE: 84 MMHG

## 2025-04-16 DIAGNOSIS — Z12.4 SCREENING FOR CERVICAL CANCER: ICD-10-CM

## 2025-04-16 DIAGNOSIS — Z01.419 ENCOUNTER FOR GYNECOLOGICAL EXAMINATION WITHOUT ABNORMAL FINDING: ICD-10-CM

## 2025-04-16 DIAGNOSIS — N95.2 VAGINAL ATROPHY: Primary | ICD-10-CM

## 2025-04-16 PROCEDURE — G0145 SCR C/V CYTO,THINLAYER,RESCR: HCPCS | Performed by: PATHOLOGY

## 2025-04-16 PROCEDURE — G0476 HPV COMBO ASSAY CA SCREEN: HCPCS | Performed by: OBSTETRICS & GYNECOLOGY

## 2025-04-16 NOTE — ASSESSMENT & PLAN NOTE
- Discussed ACOG guidelines for pap smear screening frequency: performed today  - Discussed healthy lifestyle recommendations for diet, exercise and self breast awareness.  - Discussed ACOG recommendations for screening mammograms: up to date/not indicated today.  - Discussed age based recommendations for adequate calcium and vitamin D intake. Pt aware of recent dx of osteoporosis and will speak with PCP about f/u   - Discussed ACOG recommendations for colon cancer screening: not indicated at this time/up to date   - Safe sex practices were discussed and STI testing was not desired by the patient  - Contraceptive options were reviewed: n/a postmenopausal   - Routine follow up in 1 year was recommended or sooner as needed. All questions and concerns were addressed.

## 2025-04-16 NOTE — PROGRESS NOTES
Subjective      Jonna Mccullough is a 59 y.o. female who presents for annual exam.      Chief Complaint   Patient presents with    New Patient Visit     Yearly      Menopause late 40s/early 50's  Dryness with intercourse, denies vaginal pain otherwise  No PMB   No longer any hot flashes       Last Pap: , results not available   Last mammogram: 2024 [facility]2  Colorectal cancer screenin 1 polyp - 5 years   DEXA:  osteoporosis - has to talk to PCP about it still       History of abnormal Pap smear: no  History of abnormal mammogram: no      Family history of uterine or ovarian cancer: no  Family history of breast cancer: no  Family history of colon cancer: no      Menstrual History:  OB History          2    Para   1    Term   1            AB   1    Living   1         SAB        IAB   1    Ectopic        Multiple        Live Births   1                    No LMP recorded. Patient is postmenopausal.         Past Medical History:   Diagnosis Date    Miscarriage      History reviewed. No pertinent surgical history.  Family History   Problem Relation Age of Onset    Alzheimer's disease Mother     Colon cancer Father     Lung cancer Father     Hypertension Sister     Heart attack Brother        Social History     Tobacco Use    Smoking status: Never    Smokeless tobacco: Never   Vaping Use    Vaping status: Never Used   Substance Use Topics    Alcohol use: Yes     Alcohol/week: 1.0 standard drink of alcohol     Types: 1 Glasses of wine per week    Drug use: Never          Current Outpatient Medications:     acetaminophen (TYLENOL) 325 mg tablet, Take 325 mg by mouth every 6 (six) hours as needed for mild pain, Disp: , Rfl:     ibuprofen (MOTRIN) 800 mg tablet, Take 1 tablet (800 mg total) by mouth 3 (three) times a day, Disp: 30 tablet, Rfl: 0    No Known Allergies        Review of Systems   Constitutional:  Negative for appetite change, chills and fever.   Eyes:  Negative for visual  "disturbance.   Respiratory:  Negative for cough, chest tightness and shortness of breath.    Cardiovascular:  Negative for chest pain.   Gastrointestinal:  Negative for abdominal distention, abdominal pain, constipation, diarrhea, nausea and vomiting.   Endocrine: Negative for cold intolerance and heat intolerance.   Genitourinary:  Negative for difficulty urinating, dyspareunia, dysuria, frequency, genital sores, pelvic pain, urgency, vaginal bleeding, vaginal discharge and vaginal pain.   Musculoskeletal:  Negative for arthralgias.   Neurological:  Negative for light-headedness and headaches.   Hematological:  Does not bruise/bleed easily.   Psychiatric/Behavioral:  Negative for behavioral problems.    All other systems reviewed and are negative.      /84 (Patient Position: Sitting, Cuff Size: Standard)   Ht 5' 7.25\" (1.708 m)   Wt 69.7 kg (153 lb 9.6 oz)   BMI 23.88 kg/m²         Physical Exam  Constitutional:       General: She is not in acute distress.     Appearance: Normal appearance.   Genitourinary:      Vulva, bladder and urethral meatus normal.      No lesions in the vagina.      Right Labia: No rash, tenderness, lesions or skin changes.     Left Labia: No tenderness, lesions, skin changes or rash.     No labial fusion noted.      No inguinal adenopathy present in the right or left side.     No vaginal discharge, erythema, tenderness, bleeding or ulceration.      No vaginal prolapse present.     Mild vaginal atrophy present.       Right Adnexa: not tender, not full and no mass present.     Left Adnexa: not tender, not full and no mass present.     No cervical motion tenderness, discharge, friability, lesion or polyp.      Uterus is not enlarged, fixed, tender or irregular.      No uterine mass detected.     Uterus is anteverted.      Pelvic exam was performed with patient in the lithotomy position.   Breasts:     Right: No swelling, bleeding, inverted nipple, mass, nipple discharge, skin change " or tenderness.      Left: No swelling, bleeding, inverted nipple, mass, nipple discharge, skin change or tenderness.   HENT:      Head: Normocephalic and atraumatic.   Neck:      Thyroid: No thyromegaly.   Cardiovascular:      Rate and Rhythm: Normal rate and regular rhythm.   Pulmonary:      Effort: Pulmonary effort is normal. No accessory muscle usage or respiratory distress.   Abdominal:      General: There is no distension.      Palpations: Abdomen is soft.      Tenderness: There is no abdominal tenderness. There is no guarding or rebound.   Musculoskeletal:         General: Normal range of motion.      Cervical back: Normal range of motion and neck supple.   Lymphadenopathy:      Upper Body:      Right upper body: No supraclavicular or axillary adenopathy.      Left upper body: No supraclavicular or axillary adenopathy.      Lower Body: No right inguinal and no right inguinal adenopathy. No left inguinal and no left inguinal adenopathy.   Neurological:      General: No focal deficit present.      Mental Status: She is alert.   Skin:     General: Skin is warm and dry.      Findings: No erythema.   Psychiatric:         Mood and Affect: Mood normal.         Behavior: Behavior normal.   Vitals and nursing note reviewed. Exam conducted with a chaperone present.               Encounter for gynecological examination without abnormal finding  - Discussed ACOG guidelines for pap smear screening frequency: performed today  - Discussed healthy lifestyle recommendations for diet, exercise and self breast awareness.  - Discussed ACOG recommendations for screening mammograms: up to date/not indicated today.  - Discussed age based recommendations for adequate calcium and vitamin D intake. Pt aware of recent dx of osteoporosis and will speak with PCP about f/u   - Discussed ACOG recommendations for colon cancer screening: not indicated at this time/up to date   - Safe sex practices were discussed and STI testing was not  desired by the patient  - Contraceptive options were reviewed: n/a postmenopausal   - Routine follow up in 1 year was recommended or sooner as needed. All questions and concerns were addressed.       Vaginal atrophy  Reviewed OTC lubricants, moisturizers. Discussed vaginal estrogen if no relief.

## 2025-04-16 NOTE — PATIENT INSTRUCTIONS
Vaginal moisturizers can be used every day for 2 weeks, then twice weekly after that.  I recommend water or silicone based lubricants such as Replens, HyaloGYN, Uberlube, Yes!, Revaree.

## 2025-04-17 ENCOUNTER — TELEPHONE (OUTPATIENT)
Dept: FAMILY MEDICINE CLINIC | Facility: CLINIC | Age: 60
End: 2025-04-17

## 2025-04-17 ENCOUNTER — PATIENT MESSAGE (OUTPATIENT)
Dept: FAMILY MEDICINE CLINIC | Facility: CLINIC | Age: 60
End: 2025-04-17

## 2025-04-17 DIAGNOSIS — M81.0 OSTEOPOROSIS, UNSPECIFIED OSTEOPOROSIS TYPE, UNSPECIFIED PATHOLOGICAL FRACTURE PRESENCE: Primary | ICD-10-CM

## 2025-04-17 LAB
HPV HR 12 DNA CVX QL NAA+PROBE: POSITIVE
HPV16 DNA CVX QL NAA+PROBE: NEGATIVE
HPV18 DNA CVX QL NAA+PROBE: NEGATIVE

## 2025-04-17 NOTE — PATIENT COMMUNICATION
Pt notified and per pt request I sent my chart message with the information for Dr. Epifanio Navarro in [facility].

## 2025-04-18 NOTE — TELEPHONE ENCOUNTER
Spoke with pt to go over results and next steps, phone call was cut off. Pt called back and spoke with one of my coworkers.

## 2025-04-23 ENCOUNTER — RESULTS FOLLOW-UP (OUTPATIENT)
Dept: OBGYN CLINIC | Facility: CLINIC | Age: 60
End: 2025-04-23

## 2025-04-23 LAB
LAB AP GYN PRIMARY INTERPRETATION: NORMAL
Lab: NORMAL
PATH INTERP SPEC-IMP: NORMAL

## 2025-04-23 PROCEDURE — G0124 SCREEN C/V THIN LAYER BY MD: HCPCS | Performed by: PATHOLOGY

## 2025-04-23 NOTE — TELEPHONE ENCOUNTER
Patient calling in with additional questions regarding results. Reviewed general education about HPV and abnormal pap smear results. Patient verbalized understanding.

## 2025-05-16 ENCOUNTER — PROCEDURE VISIT (OUTPATIENT)
Dept: OBGYN CLINIC | Facility: CLINIC | Age: 60
End: 2025-05-16

## 2025-05-16 VITALS
SYSTOLIC BLOOD PRESSURE: 128 MMHG | BODY MASS INDEX: 23.98 KG/M2 | HEIGHT: 67 IN | DIASTOLIC BLOOD PRESSURE: 86 MMHG | WEIGHT: 152.8 LBS

## 2025-05-16 DIAGNOSIS — R87.619 ATYPICAL GLANDULAR CELLS ON CERVICAL PAP SMEAR: Primary | ICD-10-CM

## 2025-05-16 PROBLEM — Z01.419 ENCOUNTER FOR GYNECOLOGICAL EXAMINATION WITHOUT ABNORMAL FINDING: Status: RESOLVED | Noted: 2025-04-16 | Resolved: 2025-05-16

## 2025-05-16 NOTE — ASSESSMENT & PLAN NOTE
Due to AGC/HPV+ reviewed recommendation for colposcopy with ECC and EMB  She reiterates no prior abnml pap and no PMB   Small amount of ACW change. Biopsies taken  EMB with small amount of tissue despite being able to sound easily to 7 cm without dilation and pt's good tolerance of procedure  Will f/u in 2 weeks to discuss pathology  Precautions reviewed

## 2025-05-16 NOTE — PROGRESS NOTES
Colposcopy    Date/Time: 5/16/2025 1:00 PM    Performed by: Madisyn Scales MD  Authorized by: Madisyn Scales MD    Verbal consent obtained?: Yes    Written consent obtained?: Yes    Risks and benefits: Risks, benefits and alternatives were discussed    Consent given by:  Patient  Patient states understanding of procedure being performed: Yes    Patient's understanding of procedure matches consent: Yes    Procedure consent matches procedure scheduled: Yes    Relevant documents present and verified: Yes    Test results available and properly labeled: Yes    Required items: Required blood products, implants, devices and special equipment available    Patient identity confirmed:  Verbally with patient  Indication:     Indications: AGC.  Procedure:     Procedure: Colposcopy w/ cervical biopsy and ECC      Under satisfactory analgesia the patient was prepped and draped in the dorsal lithotomy position: yes      Klondike speculum was placed in the vagina: yes      Cervix was cleansed with betadine: yes      Under colposcopic examination the transition zone was seen in entirety: yes      Endocervix was curetted using a Kevorkian curette: yes      Cervical biopsy performed with a cervical biopsy punch: yes (12 and 9 o clock)      Allis/Tenaculum removed and cervix homostatic: yes      Specimen(s) to pathology: yes    Post-procedure:     Findings: White epithelium      Impression: Low grade cervical dysplasia      Patient tolerance of procedure:  Tolerated well, no immediate complications  Endometrial biopsy    Date/Time: 5/16/2025 1:00 PM    Performed by: Madisyn Scales MD  Authorized by: Madisyn Scales MD    [place] Protocol:  Consent: Verbal consent obtained. Written consent obtained  Risks and benefits: risks, benefits and alternatives were discussed  Consent given by: patient  Patient understanding: patient states understanding of the procedure being performed  Patient consent: the patient's  understanding of the procedure matches consent given  Procedure consent: procedure consent matches procedure scheduled  Relevant documents: relevant documents present and verified  Test results: test results available and properly labeled  Required items: required blood products, implants, devices, and special equipment available  Patient identity confirmed: verbally with patient    Procedure:     Procedure: endometrial biopsy with Pipelle      A bivalve speculum was placed in the vagina: yes      Cervix cleaned and prepped: yes      The cervix was dilated: no      Uterus sounded: yes      Uterus sound depth (cm):  7    Specimen collected: specimen collected and sent to pathology      Patient tolerated procedure well with no complications: yes    Comments:     Procedure comments:  Somewhat small amount of tissue - reviewed possibility of insufficient endometrial sampling         Atypical glandular cells on cervical Pap smear  Due to AGC/HPV+ reviewed recommendation for colposcopy with ECC and EMB  She reiterates no prior abnml pap and no PMB   Small amount of ACW change. Biopsies taken  EMB with small amount of tissue despite being able to sound easily to 7 cm without dilation and pt's good tolerance of procedure  Will f/u in 2 weeks to discuss pathology  Precautions reviewed

## 2025-05-30 PROCEDURE — 88344 IMHCHEM/IMCYTCHM EA MLT ANTB: CPT | Performed by: PATHOLOGY

## 2025-05-30 PROCEDURE — 88342 IMHCHEM/IMCYTCHM 1ST ANTB: CPT | Performed by: PATHOLOGY

## 2025-05-30 PROCEDURE — 88360 TUMOR IMMUNOHISTOCHEM/MANUAL: CPT | Performed by: PATHOLOGY

## 2025-05-30 PROCEDURE — 88305 TISSUE EXAM BY PATHOLOGIST: CPT | Performed by: PATHOLOGY

## 2025-06-01 ENCOUNTER — RESULTS FOLLOW-UP (OUTPATIENT)
Dept: OBGYN CLINIC | Facility: CLINIC | Age: 60
End: 2025-06-01

## 2025-06-01 DIAGNOSIS — R87.69: Primary | ICD-10-CM

## 2025-06-04 ENCOUNTER — TELEPHONE (OUTPATIENT)
Dept: OBGYN CLINIC | Facility: CLINIC | Age: 60
End: 2025-06-04

## 2025-06-04 ENCOUNTER — OFFICE VISIT (OUTPATIENT)
Dept: OBGYN CLINIC | Facility: CLINIC | Age: 60
End: 2025-06-04

## 2025-06-04 VITALS
DIASTOLIC BLOOD PRESSURE: 80 MMHG | OXYGEN SATURATION: 95 % | WEIGHT: 154 LBS | HEIGHT: 67 IN | SYSTOLIC BLOOD PRESSURE: 118 MMHG | BODY MASS INDEX: 24.17 KG/M2 | HEART RATE: 76 BPM

## 2025-06-04 DIAGNOSIS — R87.619 ATYPICAL GLANDULAR CELLS ON CERVICAL PAP SMEAR: Primary | ICD-10-CM

## 2025-06-04 RX ORDER — MISOPROSTOL 200 UG/1
TABLET ORAL
Qty: 2 TABLET | Refills: 0 | Status: SHIPPED | OUTPATIENT
Start: 2025-06-04 | End: 2025-06-13

## 2025-06-04 NOTE — TELEPHONE ENCOUNTER
Talked to patient she is scheduled for her surgical procedure on 6/13/2025 with Dr. Scales at the [facility]. Patient is aware of lab work needed prior to her surgical procedure. 2 week post op appointment scheduled for 7/3/2025

## 2025-06-04 NOTE — PROGRESS NOTES
"    Subjective   Patient ID: Jonna Mccullough is a 59 y.o. female.    Patient is here for a problem visit.    Chief Complaint   Patient presents with    Follow-up     Here to discuss D&C procedure     Menstrual History:  OB History          2    Para   1    Term   1            AB   1    Living   1         SAB        IAB   1    Ectopic        Multiple        Live Births   1                  No LMP recorded. Patient is postmenopausal.         Past Medical History[1]    Past Surgical History[2]    Social History[3]     No Known Allergies    Current Medications[4]      Review of Systems   Constitutional:  Negative for appetite change, chills and fever.   Eyes:  Negative for visual disturbance.   Respiratory:  Negative for cough, chest tightness and shortness of breath.    Cardiovascular:  Negative for chest pain.   Gastrointestinal:  Negative for abdominal distention, abdominal pain, constipation, diarrhea, nausea and vomiting.   Endocrine: Negative for cold intolerance and heat intolerance.   Genitourinary:  Negative for difficulty urinating, dyspareunia, dysuria, frequency, genital sores, pelvic pain, urgency, vaginal bleeding, vaginal discharge and vaginal pain.   Musculoskeletal:  Negative for arthralgias.   Neurological:  Negative for light-headedness and headaches.   Hematological:  Does not bruise/bleed easily.   Psychiatric/Behavioral:  Negative for behavioral problems.    All other systems reviewed and are negative.        /80 (BP Location: Right arm, Patient Position: Sitting, Cuff Size: Adult)   Pulse 76   Ht 5' 7\" (1.702 m)   Wt 69.9 kg (154 lb)   SpO2 95%   BMI 24.12 kg/m²       Physical Exam  Constitutional:       General: She is not in acute distress.     Appearance: Normal appearance.   HENT:      Head: Normocephalic and atraumatic.     Cardiovascular:      Rate and Rhythm: Normal rate.   Pulmonary:      Effort: Pulmonary effort is normal. No respiratory distress. "     Neurological:      General: No focal deficit present.      Mental Status: She is alert.     Psychiatric:         Mood and Affect: Mood normal.         Behavior: Behavior normal.   Vitals and nursing note reviewed.           Results for orders placed or performed in visit on 05/16/25   Tissue Exam   Result Value Ref Range    Case Report       Surgical Pathology Report                         Case: [id number]                                  Authorizing Provider:  Madisyn Scales MD      Collected:           05/16/2025 [facility]              Ordering Location:     [facility]  Received:            05/16/2025 [facility]                                     [facility]                                                                 Pathologist:           Henry Goldstein MD                                                            Specimens:   A) - Endometrium, EMB                                                                               B) - Vaginal/Cervical, CX BX 12 o'clock                                                             C) - Vaginal/Cervical, CX BX 9 o'clock                                                              D) - Endocervical, ECC                                                                     Final Diagnosis       A. Endometrium, EMB:  Rare minute fragments of inactive endometrial glands and endocervical glands intermixed with abundant mucus and some blood  No atypia or malignancy identified in this limited cellularity specimen     B. Vaginal/Cervical, CX BX 12 o'clock:  Polypoid fragment of partially denuded benign squamocolumnar junction mucosa with submucosal Nabothian cyst    C. Vaginal/Cervical, CX BX 9 o'clock:  Transitional zone mucosa with focal cytologic atypia     See note    D. Endocervical, ECC:  Minute fragments of atypical squamous epithelium concerning for dysplasia  Background fragments of endocervical mucosa with squamous metaplasia intermixed with abundant  "mucus    See note          Note       C. This is a largely denuded mucosa. There is focal cytologic atypia with focal nuclear enlargement and hyperchromasia of uncertain significance (immature squamous metaplasia versus dysplasia).  These atypical foci are not present on p16/Ki67 double immunostained and p16 and Ki67 immunostained slides precluding further characterization. Multiple H&E levels examined.  If clinically indicated additional sampling may be helpful.     D. The atypical focus is not seen on deeper levels on H&E stained and p16/ki67 immunostained  slides.    Intradepartmental GYN pathologist review in agreement    nterpretation performed at [facility], [address] [place] [id number]        Additional Information       All reported additional testing was performed with appropriately reactive controls.  These tests were developed and their performance characteristics determined by [facility] or appropriate performing facility, though some tests may be performed on tissues which have not been validated for performance characteristics (such as staining performed on alcohol exposed cell blocks and decalcified tissues).  Results should be interpreted with caution and in the context of the patients’ clinical condition. These tests may not be cleared or approved by the U.S. Food and Drug Administration, though the FDA has determined that such clearance or approval is not necessary. These tests are used for clinical purposes and they should not be regarded as investigational or for research. This laboratory has been approved by CLIA 88, designated as a high-complexity laboratory and is qualified to perform these tests.  .      Gross Description       A. The specimen is received in formalin, labeled with the patient's name and hospital number, and is designated \" endometrium EMB\".  It consists of a 2.5 x 1.5 x 0.1 cm aggregate of tan-brown tissue fragments and mucoid material.  " "Entirely submitted in an embedding bag in cassette A1.  B. The specimen is received in formalin, labeled with the patient's name and hospital number, and is designated \" cervix biopsy 12:00\".  It consists of a 0.3 cm tan-brown tissue fragment.  Entirely submitted in a screen cassette.  C. The specimen is received in formalin, labeled with the patient's name and hospital number, and is designated \" cervix biopsy 9:00\".  It consists of two 0.1 cm tan-pink tissue fragments.  Entirely submitted in a screen cassette.  D. The specimen is received in formalin, labeled with the patient's name and hospital number, and is designated \" endocervical ECC\".  It consists of a 2.5 x 1.5 x 0.1 cm aggregate of tan-brown tissue fragments and mucoid material.  Entirely submitted in an embedding bag in cassette D1.    Note: The estimated total formalin fixation time based upon information provided by the submitting clinician and the standard processing schedule is under 72 hours.  [place]rentino      Clinical Information       DO   Impression: Low grade cervical dysplasia               Appropriate laboratory testing, imaging studies, and prior external records were reviewed:     Assessment/Plan:       Problem List Items Addressed This Visit       Atypical glandular cells on cervical Pap smear - Primary    Discussed given somewhat insufficient endometrial sampling and mild atypia within ECC will recommend D&C, hysteroscopy, with repeat endocervical sampling    Discussed exam under anesthesia, dilation and curettage, hysteroscopy, endocervical curettage and associated risks/benefits/alternatives. Reviewed risks of pain, bleeding, infection, damage to surrounding structures, uterine perforation, fluid overload, need for additional procedures, and risks associated with anesthesia.   Reviewed that a pelvic exam under anesthesia is within the scope of care for the above surgical procedure.   Patient voiced understanding of above and desires to " proceed.  - PAT and instructions reviewed, wash given. Surgical e-consent signed  - misoprostol sent to be used day of surgery  - aware to schedule pelvic US to be done before surgery  - surgical scheduler to contact patient            Relevant Medications    miSOPROStol (Cytotec) 200 mcg tablet                    [1]   Past Medical History:  Diagnosis Date    Miscarriage 1999   [2]   Past Surgical History:  Procedure Laterality Date    DILATION AND CURETTAGE OF UTERUS WITH HYSTEROSOCPY     [3]   Social History  Tobacco Use    Smoking status: Never    Smokeless tobacco: Never   Vaping Use    Vaping status: Never Used   Substance Use Topics    Alcohol use: Yes     Alcohol/week: 1.0 standard drink of alcohol     Types: 1 Glasses of wine per week    Drug use: Never   [4]   Current Outpatient Medications:     miSOPROStol (Cytotec) 200 mcg tablet, Place 2 tablets in vagina 2-3 hours before surgery, Disp: 2 tablet, Rfl: 0    acetaminophen (TYLENOL) 325 mg tablet, Take 325 mg by mouth every 6 (six) hours as needed for mild pain, Disp: , Rfl:     ibuprofen (MOTRIN) 800 mg tablet, Take 1 tablet (800 mg total) by mouth 3 (three) times a day, Disp: 30 tablet, Rfl: 0

## 2025-06-04 NOTE — ASSESSMENT & PLAN NOTE
Discussed given somewhat insufficient endometrial sampling and mild atypia within ECC will recommend D&C, hysteroscopy, with repeat endocervical sampling    Discussed exam under anesthesia, dilation and curettage, hysteroscopy, endocervical curettage and associated risks/benefits/alternatives. Reviewed risks of pain, bleeding, infection, damage to surrounding structures, uterine perforation, fluid overload, need for additional procedures, and risks associated with anesthesia.   Reviewed that a pelvic exam under anesthesia is within the scope of care for the above surgical procedure.   Patient voiced understanding of above and desires to proceed.  - PAT and instructions reviewed, wash given. Surgical e-consent signed  - misoprostol sent to be used day of surgery  - aware to schedule pelvic US to be done before surgery  - surgical scheduler to contact patient

## 2025-06-04 NOTE — TELEPHONE ENCOUNTER
MD Keiko Ross MA  [facility] OB GYN Department  Surgery Scheduling Sheet    Patient Name: Jonna Mccullough  : 1965    Provider: Madisyn Scales MD     Needed: no; Language: N/A    Procedure: exam under anesthesia, dilation and curettage , and hysteroscopy, endocervical curettage    Diagnosis: DO pap    Special Needs or Equipment: none    Anesthesia: choice    Length of stay: outpatient  Does patient have comorbid conditions that will require close perioperative monitoring prior to safe discharge: no    The patient has comorbid conditions that will require close perioperative monitoring prior to safe discharge, including N/A.  This may require acute care beyond the usual and routine recovery period. As such, inpatient admission post-operatively is expected and appropriate, and anticipated hospital length of stay will be >2 midnights.    Pre-Admission Testing Needed: yes  Labs that should be ordered: cbc and type and screen    Order PAT that is recommended in prep for procedure?: Not Indicated    Medical Clearance Needed: no; Provider: N/A    MA Form Signed (tubals/hysterectomy): Not Indicated    Surgical Drink Given: no    How many days out of work: 1 day(s)    How many days no drivin day(s)      Is pre op appt needed?  no  Interval for post op appt: 2 week(s)      For Surgical Scheduler:    Surgery Scheduled On:  [place]: [facility]    Pre-op Appt:  Post op Appt: 2025  Consult/Medical clearance appt: Not Needed

## 2025-06-05 NOTE — PRE-PROCEDURE INSTRUCTIONS
Medication: Oxycodone  Sig: take 1 tablet by mouth four times a day  Qty: 120  Dosage: 15mg  Last Refill: last month  Pharm[facility]: [facility] [phone]  Patient last seen:  Next appointment to be seen:    The patient wanted to remind you that this is the month that she needs the prescription authorized to be refilled 08/13, because she is leaving for her cruise on 08/14.  If you have any questions, please give her a call.       Pre-Surgery Instructions:   Medication Instructions    miSOPROStol (Cytotec) 200 mcg tablet Instructions provided by MD   Medication instructions for day of surgery reviewed. Please take all instructed medications with only a sip of water. Please do not take any over the counter (non-prescribed) vitamins or supplements for one week prior to date of surgery.      You will receive a call one business day prior to surgery with an arrival time and hospital directions. If your surgery is scheduled on a Monday, the hospital will be calling you on the Friday prior to your surgery. If you have not heard from anyone by 8pm, please call the hospital supervisor through the hospital  at [phone]. ([place] [phone] or [place] [phone]).    Do not eat or drink anything after midnight the night before your surgery, including candy, mints, lifesavers, or chewing gum. Do not drink alcohol 24hrs before your surgery. Try not to smoke at least 24hrs before your surgery.       Follow the pre surgery showering instructions as listed in the “My Surgical Experience Booklet” or otherwise provided by your surgeon's office. Do not use a blade to shave the surgical area 1 week before surgery. It is okay to use a clean electric clippers up to 24 hours before surgery. Do not apply any lotions, creams, including makeup, cologne, deodorant, or perfumes after showering on the day of your surgery. Do not use dry shampoo, hair spray, hair gel, or any type of hair products.     No contact lenses, eye make-up, or artificial eyelashes. Remove nail polish, including gel polish, and any artificial, gel, or acrylic nails if possible. Remove all jewelry including rings and body piercing jewelry.     Wear causal clothing that is easy to take on and off. Consider your type of surgery.    Keep any valuables, jewelry, piercings at home. Please bring any specially ordered equipment (sling, braces) if indicated.    Arrange for a responsible  person to drive you to and from the hospital on the day of your surgery. Please confirm the visitor policy for the day of your procedure when you receive your phone call with an arrival time.     Call the surgeon's office with any new illnesses, exposures, or additional questions prior to surgery.    Please reference your “My Surgical Experience Booklet” for additional information to prepare for your upcoming surgery.     No vitamins or NSAIDs 1 week before surgery. Tylenol ok if needed.

## 2025-06-09 ENCOUNTER — APPOINTMENT (OUTPATIENT)
Dept: LAB | Facility: HOSPITAL | Age: 60
End: 2025-06-09
Payer: COMMERCIAL

## 2025-06-09 DIAGNOSIS — Z01.818 PRE-OP TESTING: Primary | ICD-10-CM

## 2025-06-09 LAB
ERYTHROCYTE [DISTWIDTH] IN BLOOD BY AUTOMATED COUNT: 12.4 % (ref 11.6–15.1)
HCT VFR BLD AUTO: 41.9 % (ref 34.8–46.1)
HGB BLD-MCNC: 13.5 G/DL (ref 11.5–15.4)
MCH RBC QN AUTO: 29.1 PG (ref 26.8–34.3)
MCHC RBC AUTO-ENTMCNC: 32.2 G/DL (ref 31.4–37.4)
MCV RBC AUTO: 90 FL (ref 82–98)
PLATELET # BLD AUTO: 334 THOUSANDS/UL (ref 149–390)
PMV BLD AUTO: 10.1 FL (ref 8.9–12.7)
RBC # BLD AUTO: 4.64 MILLION/UL (ref 3.81–5.12)
WBC # BLD AUTO: 6.64 THOUSAND/UL (ref 4.31–10.16)

## 2025-06-09 PROCEDURE — 36415 COLL VENOUS BLD VENIPUNCTURE: CPT

## 2025-06-09 PROCEDURE — 86850 RBC ANTIBODY SCREEN: CPT | Performed by: OBSTETRICS & GYNECOLOGY

## 2025-06-09 PROCEDURE — 86900 BLOOD TYPING SEROLOGIC ABO: CPT | Performed by: OBSTETRICS & GYNECOLOGY

## 2025-06-09 PROCEDURE — 86901 BLOOD TYPING SEROLOGIC RH(D): CPT | Performed by: OBSTETRICS & GYNECOLOGY

## 2025-06-09 PROCEDURE — 85027 COMPLETE CBC AUTOMATED: CPT

## 2025-06-10 ENCOUNTER — LAB REQUISITION (OUTPATIENT)
Dept: LAB | Facility: HOSPITAL | Age: 60
End: 2025-06-10
Payer: COMMERCIAL

## 2025-06-10 DIAGNOSIS — Z01.818 ENCOUNTER FOR OTHER PREPROCEDURAL EXAMINATION: ICD-10-CM

## 2025-06-10 LAB
ABO GROUP BLD: NORMAL
BLD GP AB SCN SERPL QL: NEGATIVE
RH BLD: POSITIVE
SPECIMEN EXPIRATION DATE: NORMAL

## 2025-06-11 ENCOUNTER — HOSPITAL ENCOUNTER (OUTPATIENT)
Dept: ULTRASOUND IMAGING | Facility: HOSPITAL | Age: 60
Discharge: HOME | End: 2025-06-11
Attending: OBSTETRICS & GYNECOLOGY
Payer: COMMERCIAL

## 2025-06-11 ENCOUNTER — ANESTHESIA EVENT (OUTPATIENT)
Dept: PERIOP | Facility: HOSPITAL | Age: 60
End: 2025-06-11
Payer: COMMERCIAL

## 2025-06-11 DIAGNOSIS — R87.69: ICD-10-CM

## 2025-06-11 PROCEDURE — 76856 US EXAM PELVIC COMPLETE: CPT

## 2025-06-11 PROCEDURE — 76830 TRANSVAGINAL US NON-OB: CPT

## 2025-06-12 ENCOUNTER — RESULTS FOLLOW-UP (OUTPATIENT)
Dept: OBGYN CLINIC | Facility: CLINIC | Age: 60
End: 2025-06-12

## 2025-06-13 ENCOUNTER — TELEPHONE (OUTPATIENT)
Dept: ADMINISTRATIVE | Facility: OTHER | Age: 60
End: 2025-06-13

## 2025-06-13 ENCOUNTER — HOSPITAL ENCOUNTER (OUTPATIENT)
Facility: HOSPITAL | Age: 60
Setting detail: OUTPATIENT SURGERY
Discharge: HOME | End: 2025-06-13
Attending: OBSTETRICS & GYNECOLOGY | Admitting: OBSTETRICS & GYNECOLOGY
Payer: COMMERCIAL

## 2025-06-13 ENCOUNTER — ANESTHESIA (OUTPATIENT)
Dept: PERIOP | Facility: HOSPITAL | Age: 60
End: 2025-06-13
Payer: COMMERCIAL

## 2025-06-13 VITALS
RESPIRATION RATE: 16 BRPM | WEIGHT: 151.68 LBS | TEMPERATURE: 97.2 F | BODY MASS INDEX: 23.76 KG/M2 | OXYGEN SATURATION: 100 % | SYSTOLIC BLOOD PRESSURE: 133 MMHG | HEART RATE: 65 BPM | DIASTOLIC BLOOD PRESSURE: 73 MMHG

## 2025-06-13 DIAGNOSIS — R87.619 ATYPICAL GLANDULAR CELLS OF UNDETERMINED SIGNIFICANCE (AGUS) ON CERVICAL PAP SMEAR: ICD-10-CM

## 2025-06-13 PROBLEM — Z98.890 S/P D&C (STATUS POST DILATION AND CURETTAGE): Status: ACTIVE | Noted: 2025-06-13

## 2025-06-13 LAB
ABO GROUP BLD: NORMAL
RH BLD: POSITIVE

## 2025-06-13 PROCEDURE — 58558 HYSTEROSCOPY BIOPSY: CPT | Performed by: OBSTETRICS & GYNECOLOGY

## 2025-06-13 PROCEDURE — 88305 TISSUE EXAM BY PATHOLOGIST: CPT | Performed by: PATHOLOGY

## 2025-06-13 RX ORDER — ONDANSETRON 2 MG/ML
4 INJECTION INTRAMUSCULAR; INTRAVENOUS ONCE AS NEEDED
Status: DISCONTINUED | OUTPATIENT
Start: 2025-06-13 | End: 2025-06-13

## 2025-06-13 RX ORDER — MAGNESIUM HYDROXIDE 1200 MG/15ML
LIQUID ORAL AS NEEDED
Status: DISCONTINUED | OUTPATIENT
Start: 2025-06-13 | End: 2025-06-13 | Stop reason: HOSPADM

## 2025-06-13 RX ORDER — ACETAMINOPHEN 325 MG/1
975 TABLET ORAL EVERY 6 HOURS PRN
Status: DISCONTINUED | OUTPATIENT
Start: 2025-06-13 | End: 2025-06-13 | Stop reason: HOSPADM

## 2025-06-13 RX ORDER — KETOROLAC TROMETHAMINE 30 MG/ML
INJECTION, SOLUTION INTRAMUSCULAR; INTRAVENOUS AS NEEDED
Status: DISCONTINUED | OUTPATIENT
Start: 2025-06-13 | End: 2025-06-13

## 2025-06-13 RX ORDER — ONDANSETRON 2 MG/ML
INJECTION INTRAMUSCULAR; INTRAVENOUS AS NEEDED
Status: DISCONTINUED | OUTPATIENT
Start: 2025-06-13 | End: 2025-06-13

## 2025-06-13 RX ORDER — PROPOFOL 10 MG/ML
INJECTION, EMULSION INTRAVENOUS AS NEEDED
Status: DISCONTINUED | OUTPATIENT
Start: 2025-06-13 | End: 2025-06-13

## 2025-06-13 RX ORDER — SODIUM CHLORIDE, SODIUM LACTATE, POTASSIUM CHLORIDE, CALCIUM CHLORIDE 600; 310; 30; 20 MG/100ML; MG/100ML; MG/100ML; MG/100ML
125 INJECTION, SOLUTION INTRAVENOUS CONTINUOUS
Status: DISCONTINUED | OUTPATIENT
Start: 2025-06-13 | End: 2025-06-13

## 2025-06-13 RX ORDER — IBUPROFEN 600 MG/1
600 TABLET, FILM COATED ORAL EVERY 6 HOURS PRN
Status: DISCONTINUED | OUTPATIENT
Start: 2025-06-13 | End: 2025-06-13 | Stop reason: HOSPADM

## 2025-06-13 RX ORDER — LIDOCAINE HYDROCHLORIDE 20 MG/ML
INJECTION, SOLUTION EPIDURAL; INFILTRATION; INTRACAUDAL; PERINEURAL AS NEEDED
Status: DISCONTINUED | OUTPATIENT
Start: 2025-06-13 | End: 2025-06-13

## 2025-06-13 RX ORDER — ONDANSETRON 2 MG/ML
4 INJECTION INTRAMUSCULAR; INTRAVENOUS EVERY 6 HOURS PRN
Status: DISCONTINUED | OUTPATIENT
Start: 2025-06-13 | End: 2025-06-13 | Stop reason: HOSPADM

## 2025-06-13 RX ORDER — FENTANYL CITRATE 50 UG/ML
INJECTION, SOLUTION INTRAMUSCULAR; INTRAVENOUS AS NEEDED
Status: DISCONTINUED | OUTPATIENT
Start: 2025-06-13 | End: 2025-06-13

## 2025-06-13 RX ORDER — SODIUM CHLORIDE, SODIUM LACTATE, POTASSIUM CHLORIDE, CALCIUM CHLORIDE 600; 310; 30; 20 MG/100ML; MG/100ML; MG/100ML; MG/100ML
20 INJECTION, SOLUTION INTRAVENOUS CONTINUOUS
Status: DISCONTINUED | OUTPATIENT
Start: 2025-06-13 | End: 2025-06-13

## 2025-06-13 RX ORDER — MIDAZOLAM HYDROCHLORIDE 2 MG/2ML
INJECTION, SOLUTION INTRAMUSCULAR; INTRAVENOUS AS NEEDED
Status: DISCONTINUED | OUTPATIENT
Start: 2025-06-13 | End: 2025-06-13

## 2025-06-13 RX ORDER — PROPOFOL 10 MG/ML
INJECTION, EMULSION INTRAVENOUS CONTINUOUS PRN
Status: DISCONTINUED | OUTPATIENT
Start: 2025-06-13 | End: 2025-06-13

## 2025-06-13 RX ORDER — FENTANYL CITRATE/PF 50 MCG/ML
25 SYRINGE (ML) INJECTION
Status: DISCONTINUED | OUTPATIENT
Start: 2025-06-13 | End: 2025-06-13

## 2025-06-13 RX ADMIN — DEXMEDETOMIDINE HYDROCHLORIDE 8 MCG: 100 INJECTION, SOLUTION INTRAVENOUS at 12:46

## 2025-06-13 RX ADMIN — DEXMEDETOMIDINE HYDROCHLORIDE 4 MCG: 100 INJECTION, SOLUTION INTRAVENOUS at 12:50

## 2025-06-13 RX ADMIN — DEXMEDETOMIDINE HYDROCHLORIDE 4 MCG: 100 INJECTION, SOLUTION INTRAVENOUS at 12:42

## 2025-06-13 RX ADMIN — PROPOFOL 100 MCG/KG/MIN: 10 INJECTION, EMULSION INTRAVENOUS at 12:44

## 2025-06-13 RX ADMIN — MIDAZOLAM 2 MG: 1 INJECTION INTRAMUSCULAR; INTRAVENOUS at 12:38

## 2025-06-13 RX ADMIN — FENTANYL CITRATE 25 MCG: 50 INJECTION INTRAMUSCULAR; INTRAVENOUS at 12:44

## 2025-06-13 RX ADMIN — DEXMEDETOMIDINE HYDROCHLORIDE 4 MCG: 100 INJECTION, SOLUTION INTRAVENOUS at 12:38

## 2025-06-13 RX ADMIN — LIDOCAINE HYDROCHLORIDE 20 MG: 20 INJECTION, SOLUTION EPIDURAL; INFILTRATION; INTRACAUDAL at 12:44

## 2025-06-13 RX ADMIN — PROPOFOL 30 MG: 10 INJECTION, EMULSION INTRAVENOUS at 12:58

## 2025-06-13 RX ADMIN — FENTANYL CITRATE 25 MCG: 50 INJECTION INTRAMUSCULAR; INTRAVENOUS at 12:57

## 2025-06-13 RX ADMIN — KETOROLAC TROMETHAMINE 30 MG: 30 INJECTION, SOLUTION INTRAMUSCULAR; INTRAVENOUS at 13:03

## 2025-06-13 RX ADMIN — ONDANSETRON 4 MG: 2 INJECTION INTRAMUSCULAR; INTRAVENOUS at 12:50

## 2025-06-13 RX ADMIN — SODIUM CHLORIDE, SODIUM LACTATE, POTASSIUM CHLORIDE, AND CALCIUM CHLORIDE 125 ML/HR: .6; .31; .03; .02 INJECTION, SOLUTION INTRAVENOUS at 11:56

## 2025-06-13 NOTE — ANESTHESIA POSTPROCEDURE EVALUATION
Post-Op Assessment Note    CV Status:  Stable  Pain Score: 0    Pain management: adequate    Multimodal analgesia used between 6 hours prior to anesthesia start to PACU discharge    Mental Status:  Sleepy   Hydration Status:  Stable   PONV Controlled:  None   Airway Patency:  Patent     Post Op Vitals Reviewed: Yes    No anethesia notable event occurred.    Staff: CRNA           Last Filed PACU Vitals:  Vitals Value Taken Time   Temp     Pulse     BP 79/52 06/13/25 13:11   Resp     SpO2     Vitals shown include unfiled device data.

## 2025-06-13 NOTE — DISCHARGE INSTR - AVS FIRST PAGE
Gynecology Discharge Instructions  1. Nothing in the vagina for 1-2 weeks.  2. No tub baths or swimming.  3. Call the office for fever greater than 100.4, heavy vaginal bleeding, foul-smelling vaginal discharge, or increasing pain.  4. Activity as tolerated.

## 2025-06-13 NOTE — OP NOTE
OPERATIVE REPORT  PATIENT NAME: Jonna Mccullough    :  1965  MRN: [id number]  Pt Location: [facility] OR ROOM 02    SURGERY DATE: 2025    Surgeons and Role:     * Madisyn Scales MD - Primary     * Pooja Urbina MD - Assisting    Preop Diagnosis:  Atypical glandular cells of undetermined significance (DO) on cervical Pap smear [R87.619]    Post-Op Diagnosis Codes:     * Atypical glandular cells of undetermined significance (DO) on cervical Pap smear [R87.619]    Procedure(s):  Dilation and Curettage (D&C). Hysteroscopy. endocervical curettage. exam under anesthesia    Specimen(s):  ID Type Source Tests Collected by Time Destination   1 : [facility] Tissue Endometrium TISSUE EXAM Madisyn Scales MD 2025 1259    2 : [facility] Tissue Endometrium TISSUE EXAM Madisyn Scales MD 2025 1303        Estimated Blood Loss:   Minimal    Drains:  * No LDAs found *    Anesthesia Type:   IV Sedation with Anesthesia    Operative Indications:  Atypical glandular cells of undetermined significance (DO) on cervical Pap smear [R87.619]    Operative Findings:  1.  External genitalia grossly normal in appearance.  No ulcerations, no lacerations, no lesions.    2.  Vagina and cervix were grossly normal in appearance without any lacerations or lesions.   3. Uterus sounded to 4 cm.  4. Hysteroscopic examination revealed atrophic endometrial lining. No polyps or fibroids were noted. Bilateral ostia were visualized.    Complications:   None    Procedure and Technique:  In the operating room the correct patient and procedures were identified. TIVA was administered and the patient was positioned on the OR table in the dorsal lithotomy position. All pressure points were padded and a antonieta hugger was placed to maintain control of core body temperature. The vagina and perineum were prepped with chlorhexidine and she was draped in the usual sterile fashion. Surgical timeout was performed with all in agreement.    A straight  catheter was introduced into the bladder, which was drained of 25 mL of clear yellow urine. The anterior lip of the cervix was visualized and grasped with a single tooth tenaculum. Kevorkian curette was used to collect endocervical curettage, which was sent for pathology.     The cervix was serially dilated to accommodate introduction of the hysteroscope. Hysteroscope was introduced under direct visualization using normal saline solution as the distention media. Hysteroscope was advanced to the uterine fundus and the entire uterine cavity was inspected in a systematic manner. Findings are noted above. Hysteroscope was withdrawn and sharp curetting was performed, starting at the 12 o'clock position and rotating a total of 360 degrees to cover all surfaces. Endometrial tissue was obtained and sent for pathology. The single toothed tenaculum was removed from the anterior lip of the cervix. Good hemostasis was confirmed at the tenaculum puncture sites. All instruments were removed from the vagina. Fluid deficit at the end of the procedure was minimal.    At the conclusion of the procedure, all needle, sponge, and instrument counts were noted to be correct x2.     Dr. Scales was present and participated in all key portions of the case.    Patient Disposition:  PACU      SIGNATURE: Pooja Urbina MD  DATE: June 13, 2025  TIME: 1:13 PM

## 2025-06-13 NOTE — ANESTHESIA POSTPROCEDURE EVALUATION
Post-Op Assessment Note    CV Status:  Stable  Pain Score: 1    Pain management: adequate       Mental Status:  Alert and awake   Hydration Status:  Euvolemic   PONV Controlled:  Controlled   Airway Patency:  Patent     Post Op Vitals Reviewed: Yes    No anethesia notable event occurred.    Staff: Anesthesiologist           Last Filed PACU Vitals:  Vitals Value Taken Time   Temp 97.5 °F (36.4 °C) 06/13/25 13:45   Pulse 58 06/13/25 13:51   /68 06/13/25 13:50   Resp 15 06/13/25 13:51   SpO2 99 % 06/13/25 13:51   Vitals shown include unfiled device data.    Modified Jessica:     Vitals Value Taken Time   Activity 2 06/13/25 13:45   Respiration 2 06/13/25 13:45   Circulation 2 06/13/25 13:45   Consciousness 2 06/13/25 13:45   Oxygen Saturation 2 06/13/25 13:45     Modified Jessica Score: 10

## 2025-06-13 NOTE — TELEPHONE ENCOUNTER
Upon review of the In Basket request we were able to locate, review, and update the patient chart as requested for CRC: Colonoscopy.    Any additional questions or concerns should be emailed to the Practice Liaisons via the appropriate education email address, please do not reply via In Basket.    Thank you  FLAVIO GAINES MA   [facility]

## 2025-06-13 NOTE — TELEPHONE ENCOUNTER
----- Message from Savannah STALEY sent at 6/12/2025  1:28 PM EDT -----  Regarding: Care Gap request  06/12/25 1:28 PMHello, our patient attached above has had CRC: Colonoscopy completed/performed. Please assist in updating the patient chart by pulling the Care Everywhere (CE) document. The date of service is 2021. Thank you,Savannah [facility]ongF[facility]

## 2025-06-13 NOTE — ANESTHESIA PREPROCEDURE EVALUATION
Procedure:  Dilation and Curettage (D&C), Hysteroscopy, endocervical curettage, exam under anesthesia (Uterus)    Relevant Problems   No relevant active problems        Physical Exam    Airway     Mallampati score: II  TM Distance: <3 FB  Neck ROM: full      Cardiovascular  Rhythm: regular, Rate: normal    Dental       Pulmonary   Breath sounds clear to auscultation    Neurological    She appears oriented x3.      Other Findings  post-pubertal.      Anesthesia Plan  ASA Score- 1     Anesthesia Type- IV sedation with anesthesia and general with ASA Monitors.         Additional Monitors:     Airway Plan:            Plan Factors-Exercise tolerance (METS): >4 METS.    Chart reviewed.   Existing labs reviewed. Patient summary reviewed.    Patient is not a current smoker.              Induction-     Postoperative Plan- .   Monitoring Plan - Monitoring plan - standard ASA monitoring  Post Operative Pain Plan - non-opiod analgesics    Perioperative Resuscitation Plan - Level 1 - Full Code.       Informed Consent- Anesthetic plan and risks discussed with patient.        NPO Status:  No vitals data found for the desired time range.

## 2025-06-16 ENCOUNTER — RESULTS FOLLOW-UP (OUTPATIENT)
Dept: OBGYN CLINIC | Facility: CLINIC | Age: 60
End: 2025-06-16

## 2025-06-16 PROCEDURE — 88305 TISSUE EXAM BY PATHOLOGIST: CPT | Performed by: PATHOLOGY

## 2025-06-23 ENCOUNTER — TELEPHONE (OUTPATIENT)
Dept: OBGYN CLINIC | Facility: CLINIC | Age: 60
End: 2025-06-23

## 2025-06-23 NOTE — TELEPHONE ENCOUNTER
Called and reviewed pathology - insufficient tissue sampling of endometrium and endocervix. However hysteroscopic survey was completely normal with no focal lesions. Discussed we can plan to repeat pap with HPV testing in 1 year per ASCCP guidelines, but that she should present sooner with concerning sx. All questions answered

## (undated) DEVICE — PREMIUM DRY TRAY LF: Brand: MEDLINE INDUSTRIES, INC.

## (undated) DEVICE — UNDER BUTTOCKS DRAPE W/FLUID CONTROL POUCH: Brand: CONVERTORS

## (undated) DEVICE — IV SET EXT 30 IN

## (undated) DEVICE — DOVER URETHRAL PVC CATHETER, INTEGRAL FUNNEL, SMOOTH ROUNDED TIP, 14 FR (4.7 MM) X 16": Brand: DOVER

## (undated) DEVICE — KENDALL SCD SEQUENTIAL COMPRESSION COMFORT SLEEVES, KNEE LENGTH, MEDIUM: Brand: KENDALL SCD

## (undated) DEVICE — DRAPE EQUIPMENT RF WAND

## (undated) DEVICE — GLOVE INDICATOR PI UNDERGLOVE SZ 6.5 BLUE

## (undated) DEVICE — STRL ALLENTOWN HYSTEROSCOPY PK: Brand: CARDINAL HEALTH

## (undated) DEVICE — EXIDINE 4 PCT

## (undated) DEVICE — LIGHT GLOVE GREEN

## (undated) DEVICE — STERILE 8 INCH PROCTO SWAB: Brand: CARDINAL HEALTH

## (undated) DEVICE — GLOVE PI ULTRA TOUCH SZ 6

## (undated) DEVICE — CYSTO TUBING SINGLE IRRIGATION